# Patient Record
Sex: MALE | Race: WHITE | HISPANIC OR LATINO | Employment: FULL TIME | URBAN - METROPOLITAN AREA
[De-identification: names, ages, dates, MRNs, and addresses within clinical notes are randomized per-mention and may not be internally consistent; named-entity substitution may affect disease eponyms.]

---

## 2017-04-07 ENCOUNTER — LAB VISIT (OUTPATIENT)
Dept: LAB | Facility: HOSPITAL | Age: 52
End: 2017-04-07
Attending: FAMILY MEDICINE
Payer: COMMERCIAL

## 2017-04-07 ENCOUNTER — OFFICE VISIT (OUTPATIENT)
Dept: FAMILY MEDICINE | Facility: CLINIC | Age: 52
End: 2017-04-07
Payer: COMMERCIAL

## 2017-04-07 VITALS
HEART RATE: 59 BPM | DIASTOLIC BLOOD PRESSURE: 74 MMHG | WEIGHT: 207.81 LBS | HEIGHT: 66 IN | SYSTOLIC BLOOD PRESSURE: 130 MMHG | TEMPERATURE: 97 F | OXYGEN SATURATION: 96 % | BODY MASS INDEX: 33.4 KG/M2

## 2017-04-07 DIAGNOSIS — Z72.0 SMOKELESS TOBACCO USE: ICD-10-CM

## 2017-04-07 DIAGNOSIS — N48.89: ICD-10-CM

## 2017-04-07 DIAGNOSIS — E66.09 NON MORBID OBESITY DUE TO EXCESS CALORIES: ICD-10-CM

## 2017-04-07 DIAGNOSIS — Z00.00 WELLNESS EXAMINATION: ICD-10-CM

## 2017-04-07 DIAGNOSIS — N47.1 TIGHT PREPUCE: ICD-10-CM

## 2017-04-07 DIAGNOSIS — Z00.00 WELLNESS EXAMINATION: Primary | ICD-10-CM

## 2017-04-07 LAB
ALBUMIN SERPL BCP-MCNC: 3.9 G/DL
ALP SERPL-CCNC: 61 U/L
ALT SERPL W/O P-5'-P-CCNC: 24 U/L
ANION GAP SERPL CALC-SCNC: 10 MMOL/L
AST SERPL-CCNC: 21 U/L
BASOPHILS # BLD AUTO: 0.03 K/UL
BASOPHILS NFR BLD: 0.4 %
BILIRUB SERPL-MCNC: 2.7 MG/DL
BUN SERPL-MCNC: 17 MG/DL
CALCIUM SERPL-MCNC: 9.2 MG/DL
CHLORIDE SERPL-SCNC: 107 MMOL/L
CHOLEST/HDLC SERPL: 4.8 {RATIO}
CO2 SERPL-SCNC: 25 MMOL/L
CREAT SERPL-MCNC: 1 MG/DL
DIFFERENTIAL METHOD: NORMAL
EOSINOPHIL # BLD AUTO: 0.1 K/UL
EOSINOPHIL NFR BLD: 1.7 %
ERYTHROCYTE [DISTWIDTH] IN BLOOD BY AUTOMATED COUNT: 13.2 %
EST. GFR  (AFRICAN AMERICAN): >60 ML/MIN/1.73 M^2
EST. GFR  (NON AFRICAN AMERICAN): >60 ML/MIN/1.73 M^2
GLUCOSE SERPL-MCNC: 102 MG/DL
HCT VFR BLD AUTO: 46.4 %
HDL/CHOLESTEROL RATIO: 20.8 %
HDLC SERPL-MCNC: 202 MG/DL
HDLC SERPL-MCNC: 42 MG/DL
HGB BLD-MCNC: 16 G/DL
LDLC SERPL CALC-MCNC: 116.6 MG/DL
LYMPHOCYTES # BLD AUTO: 2.3 K/UL
LYMPHOCYTES NFR BLD: 33.1 %
MCH RBC QN AUTO: 31 PG
MCHC RBC AUTO-ENTMCNC: 34.5 %
MCV RBC AUTO: 90 FL
MONOCYTES # BLD AUTO: 0.4 K/UL
MONOCYTES NFR BLD: 6.1 %
NEUTROPHILS # BLD AUTO: 4 K/UL
NEUTROPHILS NFR BLD: 58.4 %
NONHDLC SERPL-MCNC: 160 MG/DL
PLATELET # BLD AUTO: 293 K/UL
PMV BLD AUTO: 11.4 FL
POTASSIUM SERPL-SCNC: 4.8 MMOL/L
PROT SERPL-MCNC: 7.3 G/DL
RBC # BLD AUTO: 5.16 M/UL
SODIUM SERPL-SCNC: 142 MMOL/L
TRIGL SERPL-MCNC: 217 MG/DL
WBC # BLD AUTO: 6.86 K/UL

## 2017-04-07 PROCEDURE — 83036 HEMOGLOBIN GLYCOSYLATED A1C: CPT

## 2017-04-07 PROCEDURE — 80053 COMPREHEN METABOLIC PANEL: CPT

## 2017-04-07 PROCEDURE — 36415 COLL VENOUS BLD VENIPUNCTURE: CPT | Mod: PO

## 2017-04-07 PROCEDURE — 99999 PR PBB SHADOW E&M-NEW PATIENT-LVL III: CPT | Mod: PBBFAC,,, | Performed by: FAMILY MEDICINE

## 2017-04-07 PROCEDURE — 80061 LIPID PANEL: CPT

## 2017-04-07 PROCEDURE — 99386 PREV VISIT NEW AGE 40-64: CPT | Mod: 25,S$GLB,, | Performed by: FAMILY MEDICINE

## 2017-04-07 PROCEDURE — 85025 COMPLETE CBC W/AUTO DIFF WBC: CPT

## 2017-04-07 NOTE — PROGRESS NOTES
Subjective:       Patient ID: Lio Arshad is a 52 y.o. male.    Chief Complaint: Annual Exam    HPI Comments: 52 y old male obese, tobacco user (chews)  here for a wellness exam , not an structure exercise routine , not on any diet . He might have had  Colonoscopy 2 y ago  . He had Tdap  Maybe 6 y  .Occupation : Works in [employer]  . Last Opthalmology visit : 2 weeks ago .He also would like  to see urology , he has frequent episode of  balanitis maybe and prepuce doesnt  retract properly during intercourse     Review of Systems   Constitutional: Negative.    HENT: Negative.    Respiratory: Negative.    Cardiovascular: Negative.    Gastrointestinal: Negative.    Genitourinary: Positive for penile pain.   Musculoskeletal: Negative.        Objective:      Physical Exam   Constitutional: He is oriented to person, place, and time. He appears well-developed and well-nourished. No distress.   HENT:   Head: Normocephalic and atraumatic.   Right Ear: External ear normal.   Left Ear: External ear normal.   Nose: Nose normal.   Mouth/Throat: No oropharyngeal exudate.   Eyes: Conjunctivae and EOM are normal. Pupils are equal, round, and reactive to light. Right eye exhibits no discharge. Left eye exhibits no discharge. No scleral icterus.   Neck: Normal range of motion. Neck supple. No JVD present. No tracheal deviation present. No thyromegaly present.   Cardiovascular: Normal rate, regular rhythm, normal heart sounds and intact distal pulses.  Exam reveals no gallop and no friction rub.    No murmur heard.  Pulmonary/Chest: Effort normal and breath sounds normal. No stridor. No respiratory distress. He has no wheezes. He has no rales. He exhibits no tenderness.   Abdominal: Soft. Bowel sounds are normal. He exhibits no distension. There is no tenderness. There is no rebound and no guarding.   Musculoskeletal: Normal range of motion. He exhibits no edema or tenderness.   Lymphadenopathy:     He has no cervical adenopathy.    Neurological: He is alert and oriented to person, place, and time. He has normal reflexes. He displays normal reflexes. No cranial nerve deficit. He exhibits normal muscle tone. Coordination normal.   Skin: Skin is warm and dry. No rash noted. He is not diaphoretic. No erythema. No pallor.   Psychiatric: He has a normal mood and affect. His behavior is normal. Judgment and thought content normal.       Assessment:     Lio  was seen today for annual exam.    Diagnoses and all orders for this visit:    Wellness examination  -     CBC auto differential; Future  -     Comprehensive metabolic panel; Future  -     Hemoglobin A1c; Future  -     Lipid panel; Future    Irritation of prepuce  -     Ambulatory consult to Urology    Non morbid obesity due to excess calories    Tight prepuce    Smokeless tobacco use    Other orders  -     Zoster Vaccine - Live      Plan:     Discussed and recommended healthy eating habits and lifestyle changes including daily exercise of 30 mins, low salt diet, low fat diet and weight loss/maintenance to a normal body mass index 25 or below. I recommend routine use of seatbelts. Discouraged illicit drug use including tobacco use. I recommend no alcohol use but if you decide to drink, limit to 1 drink per day for females and 2 drinks per day for males. Keep up with your yearly physical visit with age appropriate screenings, vaccinations, and labs.         See urology   Diet and ex  Assistance with smoking cessation was offered, including:  []  Medications  []  Counseling  []  Printed Information on Smoking Cessation  []  Referral to a Smoking Cessation Program    Patient was counseled regarding smoking for 15 minutes.    Will request colonoscopy records

## 2017-04-07 NOTE — MR AVS SNAPSHOT
"    [facility]  [address] [place] [address]  Phone: [phone]  Fax: [phone]                  Lio Arshad   2017 8:20 AM   Office Visit    Descripción:  Male : 1965   Personal Médico:  Jill Elaine MD   Departamento:  [facility]           Razón de la daria     Annual Exam                Lista de tareas           Citas próximas        Personal Médico Departamento Tfno del dpto    2017 8:20 AM Jill Elaine MD [facility] [phone]      Metas (5 Years of Data)     Ninguna      [facility]     [facility] Línea de Enfermeras - Asistencia   Enfermeras registradas de [facility] pueden ayudarle a reservar james daria, proveer educación para la elyse, asesoría clínica, y otros servicios de asesoramiento.   Llame para maurice servicio gratuito a [phone].             Medicamentos           Mensaje sobre Medicamentos     Verificar los cambios y / o adiciones a burns régimen de medicación son los mismos que discutir con burns médico. Si cualquiera de estos cambios o adiciones son incorrectos, por favor notifique a burns proveedor de atención médica.             Verifique que la siguiente lista de medicamentos es james representación exacta de los medicamentos que está tomando actualmente. Si no hay ningunos reportados, la lista puede estar en pa. Si no es correcta, por favor póngase en contacto con burns proveedor de atención médica. Lleve esta lista con usted en ruchi de emergencia.                Información de referencia clínica           Kiara signos vitales moisés     PS Pulso Temperatura [place] SpO2    130/74 (BP Location: Left arm, Patient Position: Sitting, BP Method: Manual) 59 97.4 °F (36.3 °C) (Oral) 5' 6" (1.676 m) 94.3 kg (207 lb 12.5 oz) 96%    BMI (IMC)                   33.54 kg/m2           Blood Pressure          Most Recent Value    BP  130/74      Tiffani hoang del:  2017 "        Penicillins      Vacunas     Administradas en la fecha de la visita:  2017        None      Registrarse para [facility]     La activación de burns cuenta [facility] es tan fácil elito 1-2-3!    1) Ir a [email or web address], seleccione Registrarse Ahora, meter el código de activación y burns fecha de nacimiento, y seleccione Próximo.    [id number]  Expires: 5/10/2017  4:21 PM      2) Crear un nombre de usuario y contraseña para usar cuando se visita [facility] en el futuro y selecciona james pregunta de seguridad en ruchi de que pierda burns contraseña y seleccione Próximo.    3) Introduzca burns dirección de correo electrónico y megha clic en Registrarse!    Información Adicional  Si tiene alguna pregunta, por favor, e-mail [email or web address] o llame al [phone] para hablar con nuestro personal. Recuerde, [facility] no debe ser usada para necesidades urgentes. En ruchi de emergencia médica, llame al 911.        Language Assistance Services     ATTENTION: Language assistance services are available, free of charge. Please call [phone].      ATENCIÓN: Si habla español, tiene a burns disposición servicios gratuitos de asistencia lingüística. Llame al [phone].     [facility] Ý: N?u b?n nói Ti?ng Vi?t, có các d?ch v? h? tr? ngôn ng? mi?n phí dành cho b?n. G?i s? [phone].         [facility] cumple con las leyes federales aplicables de derechos civiles y no discrimina por motivos de amanda, color, origen nacional, edad, discapacidad, o sexo.                 Lio Arshad   2017 8:20 AM   Office Visit    Description:  Male : 1965   Provider:  Jill Elaine MD   Department:  [facility]           Reason for Visit     Annual Exam                To Do List           Future Appointments        Provider Department Dept Phone    2017 8:20 AM Jill Elaine MD [facility] [phone]      Goals     None      [facility]     [facility] Nurse Care Line - 24/7 Assistance  Unless otherwise directed by your provider, please contact [facility], our nurse care line that is available for 24/7 assistance.     Registered nurses in the [facility] provide: appointment scheduling, clinical advisement, health education, and other advisory services.  Call: [phone] (toll free)               Medications           Message regarding Medications     Verify the changes and/or additions to your medication regime listed below are the same as discussed with your clinician today.  If any of these changes or additions are incorrect, please notify your healthcare provider.             Verify that the below list of medications is an accurate representation of the medications you are currently taking.  If none reported, the list may be blank. If incorrect, please contact your healthcare provider. Carry this list with you in case of emergency.                Clinical Reference Information           Your Vitals Were     BP Pulse Temp Height Weight SpO2    130/74 (BP Location: Left arm, Patient Position: Sitting, BP Method: Manual) 59 97.4 °F (36.3 °C) (Oral) 5' 6" (1.676 m) 94.3 kg (207 lb 12.5 oz) 96%    BMI                   33.54 kg/m2           Blood Pressure          Most Recent Value    BP  130/74      Allergies as of 4/7/2017     Penicillins      Immunizations Administered on Date of Encounter - 4/7/2017     None      [facility] Sign-Up     Activating your [facility] account is as easy as 1-2-3!     1) Visit [email or web address], select Sign Up Now, enter this activation code and your date of birth, then select Next.  [email or web address]  Expires: 5/10/2017  4:21 PM      2) Create a username and password to use when you visit [facility] in the future and select a security question in case you lose your password and select Next.    3) Enter your e-mail address and click Sign Up!    Additional Information  If you have questions, please e-mail " [email or web address] or call [phone] to talk to our [facility] staff. Remember, [facility] is NOT to be used for urgent needs. For medical emergencies, dial 911.         Language Assistance Services     ATTENTION: Language assistance services are available, free of charge. Please call [phone].      ATENCIÓN: Si habla español, tiene a burns disposición servicios gratuitos de asistencia lingüística. Llame al [phone].     CHÚ Ý: N?u b?n nói Ti?ng Vi?t, có các d?ch v? h? tr? ngôn ng? mi?n phí dành cho b?n. G?i s? [phone].         [facility] complies with applicable Federal civil rights laws and does not discriminate on the basis of race, color, national origin, age, disability, or sex.

## 2017-04-08 LAB
ESTIMATED AVG GLUCOSE: 111 MG/DL
HBA1C MFR BLD HPLC: 5.5 %

## 2017-04-11 ENCOUNTER — TELEPHONE (OUTPATIENT)
Dept: FAMILY MEDICINE | Facility: CLINIC | Age: 52
End: 2017-04-11

## 2017-04-11 NOTE — TELEPHONE ENCOUNTER
----- Message from Amie Rey sent at 4/10/2017  3:07 PM CDT -----  Contact: Pt   Pt called and stated he was returning the nurse's call. He can be reached at  [phone].    Thanks,  Tf

## 2017-04-11 NOTE — TELEPHONE ENCOUNTER
----- Message from Ammy Olmstead sent at 4/11/2017  3:49 PM CDT -----  Contact: patient  Calling for test results. Please call patient ASAP @ [phone]. Thanks, merlyn

## 2017-04-21 ENCOUNTER — OFFICE VISIT (OUTPATIENT)
Dept: OPHTHALMOLOGY | Facility: CLINIC | Age: 52
End: 2017-04-21
Payer: COMMERCIAL

## 2017-04-21 ENCOUNTER — OFFICE VISIT (OUTPATIENT)
Dept: FAMILY MEDICINE | Facility: CLINIC | Age: 52
End: 2017-04-21
Payer: COMMERCIAL

## 2017-04-21 ENCOUNTER — LAB VISIT (OUTPATIENT)
Dept: LAB | Facility: HOSPITAL | Age: 52
End: 2017-04-21
Attending: FAMILY MEDICINE
Payer: COMMERCIAL

## 2017-04-21 VITALS
SYSTOLIC BLOOD PRESSURE: 108 MMHG | OXYGEN SATURATION: 96 % | HEART RATE: 61 BPM | TEMPERATURE: 98 F | BODY MASS INDEX: 34.74 KG/M2 | HEIGHT: 66 IN | DIASTOLIC BLOOD PRESSURE: 72 MMHG | WEIGHT: 216.19 LBS

## 2017-04-21 DIAGNOSIS — H10.403 CHRONIC CONJUNCTIVITIS OF BOTH EYES, UNSPECIFIED CHRONIC CONJUNCTIVITIS TYPE: ICD-10-CM

## 2017-04-21 DIAGNOSIS — E80.6 HYPERBILIRUBINEMIA: Primary | ICD-10-CM

## 2017-04-21 DIAGNOSIS — E80.6 HYPERBILIRUBINEMIA: ICD-10-CM

## 2017-04-21 DIAGNOSIS — H01.00A BLEPHARITIS OF UPPER AND LOWER EYELIDS OF BOTH EYES, UNSPECIFIED TYPE: Primary | ICD-10-CM

## 2017-04-21 DIAGNOSIS — H01.00B BLEPHARITIS OF UPPER AND LOWER EYELIDS OF BOTH EYES, UNSPECIFIED TYPE: Primary | ICD-10-CM

## 2017-04-21 DIAGNOSIS — H57.89 RED EYE: ICD-10-CM

## 2017-04-21 PROBLEM — E78.5 DYSLIPIDEMIA: Status: ACTIVE | Noted: 2017-04-21

## 2017-04-21 LAB
BILIRUB DIRECT SERPL-MCNC: 0.5 MG/DL
BILIRUB SERPL-MCNC: 1.7 MG/DL

## 2017-04-21 PROCEDURE — 99999 PR PBB SHADOW E&M-EST. PATIENT-LVL III: CPT | Mod: PBBFAC,,, | Performed by: FAMILY MEDICINE

## 2017-04-21 PROCEDURE — 99213 OFFICE O/P EST LOW 20 MIN: CPT | Mod: S$GLB,,, | Performed by: FAMILY MEDICINE

## 2017-04-21 PROCEDURE — 82248 BILIRUBIN DIRECT: CPT

## 2017-04-21 PROCEDURE — 1160F RVW MEDS BY RX/DR IN RCRD: CPT | Mod: S$GLB,,, | Performed by: FAMILY MEDICINE

## 2017-04-21 PROCEDURE — 99999 PR PBB SHADOW E&M-EST. PATIENT-LVL II: CPT | Mod: PBBFAC,,, | Performed by: OPTOMETRIST

## 2017-04-21 PROCEDURE — 92002 INTRM OPH EXAM NEW PATIENT: CPT | Mod: S$GLB,,, | Performed by: OPTOMETRIST

## 2017-04-21 PROCEDURE — 36415 COLL VENOUS BLD VENIPUNCTURE: CPT | Mod: PO

## 2017-04-21 PROCEDURE — 82247 BILIRUBIN TOTAL: CPT

## 2017-04-21 RX ORDER — NEOMYCIN SULFATE, POLYMYXIN B SULFATE AND DEXAMETHASONE 3.5; 10000; 1 MG/ML; [USP'U]/ML; MG/ML
1 SUSPENSION/ DROPS OPHTHALMIC 4 TIMES DAILY
Qty: 5 ML | Refills: 0 | Status: SHIPPED | OUTPATIENT
Start: 2017-04-21 | End: 2017-05-01

## 2017-04-21 NOTE — PROGRESS NOTES
HPI     New Patient  Chief complaint: Irritation, OS  Onset: about 1.5 months ago  Patient had gone to his doctor in [place] seeking treatment for eye   irritation  Patient was told that he had an eye infection and was given drops  Patient states that he used the drops for about 1 month and returned to   the doctor because he was not getting any better.  Patient states that the doctor had given him a different drops and still   no relief.  Patient then went to Urgent Care because it appeared to be getting worse   and was given a different drop  Patient did not bring any of these drops with him today because he went to   see his PCP and was instructed to see an eye doctor and came straight from   there.  Right eye started to become irritated about 3 days ago.  Eyes are matting in the AM  Blurred vision  Patient is not a CL wearer, glasses only  Patient is accompanied by daughter       Last edited by Saw Watson MA on 4/21/2017  1:47 PM.         Assessment /Plan     For exam results, see Encounter Report.    Blepharitis of upper and lower eyelids of both eyes, unspecified type  -     neomycin-polymyxin-dexamethasone (MAXITROL) 3.5mg/mL-10,000 unit/mL-0.1 % DrpS; Place 1 drop into both eyes 4 (four) times daily.  Dispense: 5 mL; Refill: 0    Chronic conjunctivitis of both eyes, unspecified chronic conjunctivitis type  -     neomycin-polymyxin-dexamethasone (MAXITROL) 3.5mg/mL-10,000 unit/mL-0.1 % DrpS; Place 1 drop into both eyes 4 (four) times daily.  Dispense: 5 mL; Refill: 0      Blepharoconjunctivitis OU.  WC(BID) + LS(BID) + above gtts (QID) X 10 days.  He may need oral AB if this does not work well.  I asked him to RTC if no resolution in 10 days or if S/s recur soon after cessation of treatment.  RTC prn otherwise.

## 2017-04-21 NOTE — MR AVS SNAPSHOT
[facility]  [address] [place] [address]  Phone: [phone]  Fax: [phone]                  Lio Reddyyva   2017 1:45 PM   Office Visit    Descripción:  Male : 1965   Personal Médico:  JACINTA Bhakta, OD   Departamento:  [facility]           Razón de la daria     Eye Problem           Diagnósticos de Esta Visita        Comentarios    Blepharitis of upper and lower eyelids of both eyes, unspecified type    -  Primario     Chronic conjunctivitis of both eyes, unspecified chronic conjunctivitis type                Lista de tareas           Citas próximas        Personal Médico Departamento Tfno del dpto    2017 3:40 PM Kodak Putnam IV, MD [facility] [phone]      Metas (5 Years of Data)     Ninguna      Follow-Up and Disposition     Return if symptoms worsen or fail to improve.      Recetas para recoger        Disp Refills Start End    neomycin-polymyxin-dexamethasone (MAXITROL) 3.5mg/mL-10,000 unit/mL-0.1 % DrpS 5 mL 0 2017    Place 1 drop into both eyes 4 (four) times daily. - Both Eyes    Farmacia: [facility]/pharmacy #[id number] - [place] - [address] AT [facility] [address]: #: [phone]         [facility]     [facility] Línea de Enfermeras - Asistencia   Enfermeras registradas de [facility] pueden ayudarle a reservar james daria, proveer educación para la elyse, asesoría clínica, y otros servicios de asesoramiento.   Llame para maurice servicio gratuito a [phone].             Medicamentos           Mensaje sobre Medicamentos     Verificar los cambios y / o adiciones a burns régimen de medicación son los mismos que discutir con burns médico. Si cualquiera de estos cambios o adiciones son incorrectos, por favor notifique a burns proveedor de atención médica.        EMPEZAR a carl estos medicamentos NUEVOS        Refills    neomycin-polymyxin-dexamethasone (MAXITROL) 3.5mg/mL-10,000  unit/mL-0.1 % DrpS 0    Sig: Place 1 drop into both eyes 4 (four) times daily.    Categoría: Normal    Vía: Both Eyes           Verifique que la siguiente lista de medicamentos es james representación exacta de los medicamentos que está tomando actualmente. Si no hay ningunos reportados, la lista puede estar en pa. Si no es correcta, por favor póngase en contacto con burns proveedor de atención médica. Lleve esta lista con usted en ruchi de emergencia.           Medicamentos Actuales     neomycin-polymyxin-dexamethasone (MAXITROL) 3.5mg/mL-10,000 unit/mL-0.1 % DrpS Place 1 drop into both eyes 4 (four) times daily.           Información de referencia clínica           Alergias     A partir del:  4/21/2017        Penicillins      Vacunas     Administradas en la fecha de la visita:  4/21/2017        None      Registrarse para [facility]     La activación de burns cuenta [facility] es tan fácil eliot 1-2-3!    1) Ir a [email or web address], seleccione Registrarse Ahora, meter el código de activación y burns fecha de nacimiento, y seleccione Próximo.    [id number]  Expires: 5/10/2017  4:21 PM      2) Crear un nombre de usuario y contraseña para usar cuando se visita [facility] en el futuro y selecciona james pregunta de seguridad en ruchi de que pierda burns contraseña y seleccione Próximo.    3) Introduzca burns dirección de correo electrónico y megha [facility] en Registrarse!    Información Adicional  Si tiene alguna pregunta, por favor, e-mail [email or web address] o llame al [phone] para hablar con nuestro personal. Recuerde, [facility] no debe ser usada para necesidades urgentes. En ruchi de emergencia médica, llame al 911.        Smoking Cessation     Si desea dejar de fumar:  · Usted puede ser elegible para recibir servicios gratuitos si usted es un residente de [place] y comenzó a fumar cigarrillos antes del 1 de septiembre de 1988. Llame al Smoking Cessation Trust (SCT) a [phone] o [phone].   Llame 1-800-QUIT-NOW si  usted no cumple con los criterios anteriores.   Póngase en contacto con nosotros por correo electrónico: [email or web address]   Visite nuestro sitio web para obtener más información: [email or web address]            Language Assistance Services     ATTENTION: Language assistance services are available, free of charge. Please call [phone].      ATENCIÓN: Si habla español, tiene a burns disposición servicios gratuitos de asistencia lingüística. Llame al [phone].     CHÚ Ý: N?u b?n nói Ti?ng Vi?t, có các d?ch v? h? tr? ngôn ng? mi?n phí dành cho b?n. G?i s? [phone].         [facility] cumple con las leyes federales aplicables de derechos civiles y no discrimina por motivos de amanda, color, origen nacional, edad, discapacidad, o sexo.                 Lio Arshad   2017 1:45 PM   Office Visit    Description:  Male : 1965   Provider:  JACINTA Bhakta, OD   Department:  [facility]           Reason for Visit     Eye Problem           Diagnoses this Visit        Comments    Blepharitis of upper and lower eyelids of both eyes, unspecified type    -  Primary     Chronic conjunctivitis of both eyes, unspecified chronic conjunctivitis type                To Do List           Future Appointments        Provider Department Dept Phone    2017 3:40 PM Kodak Putnam IV, MD [facility] [phone]      Goals     None      Follow-Up and Disposition     Return if symptoms worsen or fail to improve.       These Medications        Disp Refills Start End    neomycin-polymyxin-dexamethasone (MAXITROL) 3.5mg/mL-10,000 unit/mL-0.1 % DrpS 5 mL 0 2017    Place 1 drop into both eyes 4 (four) times daily. - Both Eyes    Pharmacy: [facility]/pharmacy #[id number] - [place] - [address] AT [facility] Ph #: [phone]         [facility]     [facility] Nurse Care Line -  Assistance  Unless otherwise directed by your provider,  please contact [facility], our nurse care line that is available for 24/7 assistance.     Registered nurses in the [facility] provide: appointment scheduling, clinical advisement, health education, and other advisory services.  Call: [phone] (toll free)               Medications           Message regarding Medications     Verify the changes and/or additions to your medication regime listed below are the same as discussed with your clinician today.  If any of these changes or additions are incorrect, please notify your healthcare provider.        START taking these NEW medications        Refills    neomycin-polymyxin-dexamethasone (MAXITROL) 3.5mg/mL-10,000 unit/mL-0.1 % DrpS 0    Sig: Place 1 drop into both eyes 4 (four) times daily.    Class: Normal    Route: Both Eyes           Verify that the below list of medications is an accurate representation of the medications you are currently taking.  If none reported, the list may be blank. If incorrect, please contact your healthcare provider. Carry this list with you in case of emergency.           Current Medications     neomycin-polymyxin-dexamethasone (MAXITROL) 3.5mg/mL-10,000 unit/mL-0.1 % DrpS Place 1 drop into both eyes 4 (four) times daily.           Clinical Reference Information           Allergies as of 4/21/2017     Penicillins      Immunizations Administered on Date of Encounter - 4/21/2017     None      [facility] Sign-Up     Activating your [facility] account is as easy as 1-2-3!     1) Visit [email or web address], select Sign Up Now, enter this activation code and your date of birth, then select Next.  [email or web address]  Expires: 5/10/2017  4:21 PM      2) Create a username and password to use when you visit [facility] in the future and select a security question in case you lose your password and select Next.    3) Enter your e-mail address and click Sign Up!    Additional Information  If you have questions, please e-mail  [email or web address] or call [phone] to talk to our [facility] staff. Remember, [facility] is NOT to be used for urgent needs. For medical emergencies, dial 911.         Smoking Cessation     If you would like to quit smoking:   You may be eligible for free services if you are a [place] resident and started smoking cigarettes before September 1, 1988.  Call the Smoking Cessation Trust ([facility]) toll free at (188) 563-4192 or (044) 136-4998.   Call 1-800-QUIT-NOW if you do not meet the above criteria.   Contact us via email: [email or web address]   View our website for more information: [email or web address]        Language Assistance Services     ATTENTION: Language assistance services are available, free of charge. Please call [phone].      ATENCIÓN: Si habla español, tiene a burns disposición servicios gratuitos de asistencia lingüística. Llame al [phone].     CHÚ Ý: N?u b?n nói Ti?ng Vi?t, có các d?ch v? h? tr? ngôn ng? mi?n phí dành cho b?n. G?i s? [phone].         [facility] complies with applicable Federal civil rights laws and does not discriminate on the basis of race, color, national origin, age, disability, or sex.

## 2017-04-21 NOTE — MR AVS SNAPSHOT
[facility]  [address]  [place] [address]  Phone: [phone]  Fax: [phone]                  Lio Arshad   2017 9:20 AM   Office Visit    Descripción:  Male : 1965   Personal Médico:  Jill Elaine MD   Departamento:  [facility]           Razón de la daria     Follow-up     watery red eye           Diagnósticos de Esta Visita        Comentarios    Hyperbilirubinemia    -  Primario     Red eye         Dyslipidemia                Lista de tareas           Citas próximas        Personal Médico Departamento Tfno del dpto    2017 10:30 AM MTana Bhakta OD [facility] [phone]    2017 11:20 AM [facility]ATORY[facility] [place] [facility]     2017 3:40 PM Kodak Putnam IV, MD [facility] [phone]      Metas (5 Years of Data)     Ninguna      [facility]     [facility] Línea de Enfermeras - Asistencia   Enfermeras registradas de [facility] pueden ayudarle a reservar james daria, proveer educación para la elyse, asesoría clínica, y otros servicios de asesoramiento.   Llame para maurice servicio gratuito a [phone].             Medicamentos           Mensaje sobre Medicamentos     Verificar los cambios y / o adiciones a burns régimen de medicación son los mismos que discutir con burns médico. Si cualquiera de estos cambios o adiciones son incorrectos, por favor notifique a burns proveedor de atención médica.             Verifique que la siguiente lista de medicamentos es james representación exacta de los medicamentos que está tomando actualmente. Si no hay ningunos reportados, la lista puede estar en pa. Si no es correcta, por favor póngase en contacto con burns proveedor de atención médica. Lleve esta lista con usted en ruchi de emergencia.                Información de referencia clínica           Kiara signos vitales moisés     PS Pulso Temperatura [place] Peso SpO2    108/72 (BP  "Location: Right arm, Patient Position: Sitting, BP Method: Manual) 61 98.2 °F (36.8 °C) (Oral) 5' 6" (1.676 m) 98.1 kg (216 lb 2.6 oz) 96%    BMI ([facility])                   34.89 kg/m2           Blood Pressure          Most Recent Value    BP  108/72      Alergias     A partir del:  4/21/2017        Penicillins      Vacunas     Administradas en la fecha de la visita:  4/21/2017        None      Orders Placed During Today's Visit      Órdenes normales de esta visita    Ambulatory referral to Ophthalmology     Exámenes/Procedimientos futuros Se espera el Vence    Bilirubin, direct  4/21/2017 6/20/2018    Bilirubin, total  4/21/2017 6/20/2018      Registrarse para Cristelazenaida     La activación de burns cuenta [facility] es tan fácil eliot 1-2-3!    1) Ir a [email or web address], seleccione Registrarse Ahora, meter el código de activación y burns fecha de nacimiento, y seleccione Próximo.    [id number]  Expires: 5/10/2017  4:21 PM      2) Crear un nombre de usuario y contraseña para usar cuando se visita [facility] en el futuro y selecciona james pregunta de seguridad en ruchi de que pierda burns contraseña y seleccione Próximo.    3) Introduzca burns dirección de correo electrónico y megha [facility] en Registrarse!    Información Adicional  Si tiene alguna pregunta, por favor, e-mail [email or web address] o llame al [phone] para hablar con nuestro personal. Recuerde, [facility] no debe ser usada para necesidades urgentes. En ruchi de emergencia médica, llame al 911.        Smoking Cessation     Si desea dejar de fumar:  · Usted puede ser elegible para recibir servicios gratuitos si usted es un residente de [place] y comenzó a fumar cigarrillos antes del 1 de septiembre de 1988. Llame al Smoking Cessation Trust (SCT) a [phone] o [phone].   Llame 1-800-QUIT-NOW si usted no cumple con los criterios anteriores.   Póngase en contacto con nosotros por correo electrónico: [email or web address]   Visite nuestro sitio web para " obtener más información: [email or web address]            Language Assistance Services     ATTENTION: Language assistance services are available, free of charge. Please call [phone].      ATENCIÓN: Si habkeila reyna, tiene a bursn disposición servicios gratuitos de asistencia lingüística. Llame al [phone].     CHÚ Ý: N?u b?n nói Ti?ng Vi?t, có các d?ch v? h? tr? ngôn ng? mi?n phí dành cho b?n. G?i s? [phone].         [facility] cumple con las leyes federales aplicables de derechos civiles y no discrimina por motivos de amanda, color, origen nacional, edad, discapacidad, o sexo.                 Lio Arshad   2017 9:20 AM   Office Visit    Description:  Male : 1965   Provider:  Jill Elaine MD   Department:  [facility]           Reason for Visit     Follow-up     watery red eye           Diagnoses this Visit        Comments    Hyperbilirubinemia    -  Primary     Red eye         Dyslipidemia                To Do List           Future Appointments        Provider Department Dept Phone    2017 10:30 AM JACINTA Bhakta OD [facility] [phone]    2017 11:20 AM [facility] [facility]     2017 3:40 PM Kodak Putnam IV, MD [facility] [phone]      Goals     None      [facility]     [facility] Nurse Care Line -  Assistance  Unless otherwise directed by your provider, please contact [facility], our nurse care line that is available for  assistance.     Registered nurses in the [facility] provide: appointment scheduling, clinical advisement, health education, and other advisory services.  Call: [phone] (toll free)               Medications           Message regarding Medications     Verify the changes and/or additions to your medication regime listed below are the same as discussed with your clinician today.  If any of these  "changes or additions are incorrect, please notify your healthcare provider.             Verify that the below list of medications is an accurate representation of the medications you are currently taking.  If none reported, the list may be blank. If incorrect, please contact your healthcare provider. Carry this list with you in case of emergency.                Clinical Reference Information           Your Vitals Were     BP Pulse Temp Height Weight SpO2    108/72 (BP Location: Right arm, Patient Position: Sitting, BP Method: Manual) 61 98.2 °F (36.8 °C) (Oral) 5' 6" (1.676 m) 98.1 kg (216 lb 2.6 oz) 96%    BMI                   34.89 kg/m2           Blood Pressure          Most Recent Value    BP  108/72      Allergies as of 4/21/2017     Penicillins      Immunizations Administered on Date of Encounter - 4/21/2017     None      Orders Placed During Today's Visit      Normal Orders This Visit    Ambulatory referral to Ophthalmology     Future Labs/Procedures Expected by Expires    Bilirubin, direct  4/21/2017 6/20/2018    Bilirubin, total  4/21/2017 6/20/2018      [facility] Sign-Up     Activating your [facility] account is as easy as 1-2-3!     1) Visit [email or web address], select Sign Up Now, enter this activation code and your date of birth, then select Next.  [email or web address]  Expires: 5/10/2017  4:21 PM      2) Create a username and password to use when you visit [facility] in the future and select a security question in case you lose your password and select Next.    3) Enter your e-mail address and click Sign Up!    Additional Information  If you have questions, please e-mail [email or web address] or call [phone] to talk to our [facility] staff. Remember, [facility] is NOT to be used for urgent needs. For medical emergencies, dial 911.         Smoking Cessation     If you would like to quit smoking:   You may be eligible for free services if you are a [place] resident and started smoking cigarettes before " September 1, 1988.  Call the Smoking Cessation Trust (SCT) toll free at (769) 908-5029 or (325) 785-9029.   Call 1-800-QUIT-NOW if you do not meet the above criteria.   Contact us via email: [email or web address]   View our website for more information: [email or web address]        Language Assistance Services     ATTENTION: Language assistance services are available, free of charge. Please call [phone].      ATENCIÓN: Si estelala axel, tiene a burns disposición servicios gratuitos de asistencia lingüística. Llame al [phone].     CHÚ Ý: N?u b?n nói Ti?ng Vi?t, có các d?ch v? h? tr? ngôn ng? mi?n phí dành cho b?n. G?i s? [phone].         [facility] complies with applicable Federal civil rights laws and does not discriminate on the basis of race, color, national origin, age, disability, or sex.

## 2017-04-21 NOTE — PROGRESS NOTES
Subjective:       Patient ID: Lio Arshad is a 52 y.o. male.    Chief Complaint: Follow-up (labs ) and watery red eye    HPI Comments: 52 y old male with L red watery eye for 2 w. Seen By Opth , started on topical steroids and ABx with no relief . No blurry vision, no pain  . He also would like to f.u on His bili , not taking OTC herb , no risk factors for  Infectious hep .  No  hx of Gilbert syndrome       Review of Systems   Constitutional: Negative.    HENT: Negative.    Eyes: Positive for redness and itching. Negative for photophobia, discharge and visual disturbance.   Respiratory: Negative.    Cardiovascular: Negative.    Gastrointestinal: Negative.        Objective:      Physical Exam   Constitutional: He is oriented to person, place, and time. He appears well-developed and well-nourished. No distress.   HENT:   Head: Normocephalic and atraumatic.   Right Ear: External ear normal.   Left Ear: External ear normal.   Nose: Nose normal.   Mouth/Throat: No oropharyngeal exudate.   Eyes: EOM are normal. Pupils are equal, round, and reactive to light. Right eye exhibits no discharge. Left eye exhibits no discharge. Left conjunctiva is injected. No scleral icterus.   Left eye Inner aspect : wedge-shaped growth extending onto the cornea    Neck: Normal range of motion. Neck supple. No JVD present. No tracheal deviation present. No thyromegaly present.   Cardiovascular: Normal rate, regular rhythm, normal heart sounds and intact distal pulses.  Exam reveals no gallop and no friction rub.    No murmur heard.  Pulmonary/Chest: Effort normal and breath sounds normal. No stridor. No respiratory distress. He has no wheezes. He has no rales. He exhibits no tenderness.   Abdominal: Soft. Bowel sounds are normal. He exhibits no distension. There is no tenderness. There is no rebound and no guarding.   Musculoskeletal: Normal range of motion. He exhibits no edema or tenderness.   Lymphadenopathy:     He has no cervical  adenopathy.   Neurological: He is alert and oriented to person, place, and time. He has normal reflexes. He displays normal reflexes. No cranial nerve deficit. He exhibits normal muscle tone. Coordination normal.   Skin: Skin is warm and dry. No rash noted. He is not diaphoretic. No erythema. No pallor.   Psychiatric: He has a normal mood and affect. His behavior is normal. Judgment and thought content normal.       Assessment:       Lio  was seen today for follow-up and watery red eye.    Diagnoses and all orders for this visit:    Hyperbilirubinemia  -     Bilirubin, direct; Future  -     Bilirubin, total; Future    Red eye  -     Ambulatory referral to Ophthalmology      Plan:   Get labs   Use artificial  Tears . F.u with opth

## 2017-04-21 NOTE — LETTER
April 21, 2017      Jill Elaine MD  [id number] [address] [address]           [facility]  [address] [place] [address]  Phone: [phone]  Fax: [phone]          Patient: Lio Arshad   MR Number: [id number]   YOB: 1965   Date of Visit: 4/21/2017       Dear Dr. Jill Elaine:    Thank you for referring Lio Arshad to me for evaluation. Attached you will find relevant portions of my assessment and plan of care.    If you have questions, please do not hesitate to call me. I look forward to following Lio Arshad along with you.    Sincerely,    JACINTA Bhakta, OD    Enclosure  CC:  No Recipients    If you would like to receive this communication electronically, please contact [email or web address] or [phone] to request more information on [employer] access.    For providers and/or their staff who would like to refer a patient to [facility], please contact us through our one-stop-shop provider referral line, [facility], at [phone].    If you feel you have received this communication in error or would no longer like to receive these types of communications, please e-mail [email or web address]

## 2017-05-01 ENCOUNTER — TELEPHONE (OUTPATIENT)
Dept: FAMILY MEDICINE | Facility: CLINIC | Age: 52
End: 2017-05-01

## 2017-05-01 NOTE — TELEPHONE ENCOUNTER
----- Message from Valencia Rich sent at 5/1/2017  3:28 PM CDT -----  Contact: self [phone]  States that he is calling for test results. Please call back at [phone]//thank you acc

## 2017-05-02 ENCOUNTER — TELEPHONE (OUTPATIENT)
Dept: FAMILY MEDICINE | Facility: CLINIC | Age: 52
End: 2017-05-02

## 2017-05-02 DIAGNOSIS — R17 ELEVATED BILIRUBIN: Primary | ICD-10-CM

## 2017-05-02 NOTE — TELEPHONE ENCOUNTER
----- Message from Sandra Biggs MA sent at 5/1/2017  4:23 PM CDT -----  Spoke with patient and results were given, pt verbalized understanding and agreed to see GI. Referral please.

## 2017-05-04 ENCOUNTER — LAB VISIT (OUTPATIENT)
Dept: LAB | Facility: HOSPITAL | Age: 52
End: 2017-05-04
Attending: NURSE PRACTITIONER
Payer: COMMERCIAL

## 2017-05-04 ENCOUNTER — INITIAL CONSULT (OUTPATIENT)
Dept: GASTROENTEROLOGY | Facility: CLINIC | Age: 52
End: 2017-05-04
Payer: COMMERCIAL

## 2017-05-04 VITALS
HEIGHT: 66 IN | WEIGHT: 212.75 LBS | BODY MASS INDEX: 34.19 KG/M2 | SYSTOLIC BLOOD PRESSURE: 112 MMHG | HEART RATE: 58 BPM | DIASTOLIC BLOOD PRESSURE: 72 MMHG

## 2017-05-04 DIAGNOSIS — Z12.11 COLON CANCER SCREENING: ICD-10-CM

## 2017-05-04 DIAGNOSIS — R17 ELEVATED BILIRUBIN: Primary | ICD-10-CM

## 2017-05-04 DIAGNOSIS — R17 ELEVATED BILIRUBIN: ICD-10-CM

## 2017-05-04 LAB
A1AT SERPL-MCNC: 80 MG/DL
ALBUMIN SERPL BCP-MCNC: 3.9 G/DL
ALP SERPL-CCNC: 60 U/L
ALT SERPL W/O P-5'-P-CCNC: 30 U/L
ANION GAP SERPL CALC-SCNC: 11 MMOL/L
AST SERPL-CCNC: 18 U/L
BASOPHILS # BLD AUTO: 0.05 K/UL
BASOPHILS NFR BLD: 0.7 %
BILIRUB DIRECT SERPL-MCNC: 0.5 MG/DL
BILIRUB SERPL-MCNC: 1.8 MG/DL
BUN SERPL-MCNC: 18 MG/DL
CALCIUM SERPL-MCNC: 9.3 MG/DL
CERULOPLASMIN SERPL-MCNC: 15 MG/DL
CHLORIDE SERPL-SCNC: 109 MMOL/L
CO2 SERPL-SCNC: 23 MMOL/L
CREAT SERPL-MCNC: 0.9 MG/DL
DIFFERENTIAL METHOD: ABNORMAL
EOSINOPHIL # BLD AUTO: 0.1 K/UL
EOSINOPHIL NFR BLD: 1.8 %
ERYTHROCYTE [DISTWIDTH] IN BLOOD BY AUTOMATED COUNT: 13 %
EST. GFR  (AFRICAN AMERICAN): >60 ML/MIN/1.73 M^2
EST. GFR  (NON AFRICAN AMERICAN): >60 ML/MIN/1.73 M^2
FERRITIN SERPL-MCNC: 294 NG/ML
GGT SERPL-CCNC: 43 U/L
GLUCOSE SERPL-MCNC: 87 MG/DL
HCT VFR BLD AUTO: 47 %
HGB BLD-MCNC: 16.7 G/DL
IGA SERPL-MCNC: 270 MG/DL
IGG SERPL-MCNC: 1208 MG/DL
IGM SERPL-MCNC: 57 MG/DL
INR PPP: 1
LYMPHOCYTES # BLD AUTO: 2.5 K/UL
LYMPHOCYTES NFR BLD: 34.7 %
MCH RBC QN AUTO: 31.7 PG
MCHC RBC AUTO-ENTMCNC: 35.5 %
MCV RBC AUTO: 89 FL
MONOCYTES # BLD AUTO: 0.4 K/UL
MONOCYTES NFR BLD: 6.1 %
NEUTROPHILS # BLD AUTO: 4 K/UL
NEUTROPHILS NFR BLD: 56.3 %
PLATELET # BLD AUTO: 295 K/UL
PMV BLD AUTO: 11 FL
POTASSIUM SERPL-SCNC: 4.3 MMOL/L
PROT SERPL-MCNC: 7.4 G/DL
PROTHROMBIN TIME: 10.4 SEC
RBC # BLD AUTO: 5.26 M/UL
SODIUM SERPL-SCNC: 143 MMOL/L
WBC # BLD AUTO: 7.17 K/UL

## 2017-05-04 PROCEDURE — 80048 BASIC METABOLIC PNL TOTAL CA: CPT

## 2017-05-04 PROCEDURE — 99999 PR PBB SHADOW E&M-EST. PATIENT-LVL III: CPT | Mod: PBBFAC,,, | Performed by: NURSE PRACTITIONER

## 2017-05-04 PROCEDURE — 99204 OFFICE O/P NEW MOD 45 MIN: CPT | Mod: S$GLB,,, | Performed by: NURSE PRACTITIONER

## 2017-05-04 PROCEDURE — 83516 IMMUNOASSAY NONANTIBODY: CPT

## 2017-05-04 PROCEDURE — 80076 HEPATIC FUNCTION PANEL: CPT

## 2017-05-04 PROCEDURE — 82390 ASSAY OF CERULOPLASMIN: CPT

## 2017-05-04 PROCEDURE — 82728 ASSAY OF FERRITIN: CPT

## 2017-05-04 PROCEDURE — 85025 COMPLETE CBC W/AUTO DIFF WBC: CPT

## 2017-05-04 PROCEDURE — 82977 ASSAY OF GGT: CPT

## 2017-05-04 PROCEDURE — 86704 HEP B CORE ANTIBODY TOTAL: CPT

## 2017-05-04 PROCEDURE — 85610 PROTHROMBIN TIME: CPT

## 2017-05-04 PROCEDURE — 86038 ANTINUCLEAR ANTIBODIES: CPT

## 2017-05-04 PROCEDURE — 86235 NUCLEAR ANTIGEN ANTIBODY: CPT

## 2017-05-04 PROCEDURE — 86706 HEP B SURFACE ANTIBODY: CPT

## 2017-05-04 PROCEDURE — 82784 ASSAY IGA/IGD/IGG/IGM EACH: CPT | Mod: 59

## 2017-05-04 PROCEDURE — 86790 VIRUS ANTIBODY NOS: CPT

## 2017-05-04 PROCEDURE — 87340 HEPATITIS B SURFACE AG IA: CPT

## 2017-05-04 PROCEDURE — 82103 ALPHA-1-ANTITRYPSIN TOTAL: CPT

## 2017-05-04 PROCEDURE — 36415 COLL VENOUS BLD VENIPUNCTURE: CPT | Mod: PO

## 2017-05-04 PROCEDURE — 1160F RVW MEDS BY RX/DR IN RCRD: CPT | Mod: S$GLB,,, | Performed by: NURSE PRACTITIONER

## 2017-05-04 PROCEDURE — 86803 HEPATITIS C AB TEST: CPT

## 2017-05-04 PROCEDURE — 86256 FLUORESCENT ANTIBODY TITER: CPT | Mod: 91

## 2017-05-04 RX ORDER — LOTEPREDNOL ETABONATE 5 MG/G
1 GEL OPHTHALMIC 2 TIMES DAILY
Refills: 0 | COMMUNITY
Start: 2017-04-13 | End: 2017-05-04

## 2017-05-04 RX ORDER — TOBRAMYCIN 3 MG/ML
SOLUTION/ DROPS OPHTHALMIC
Refills: 0 | COMMUNITY
Start: 2017-04-16 | End: 2017-05-04

## 2017-05-04 RX ORDER — SODIUM, POTASSIUM,MAG SULFATES 17.5-3.13G
SOLUTION, RECONSTITUTED, ORAL ORAL
Qty: 354 ML | Refills: 0 | Status: ON HOLD | OUTPATIENT
Start: 2017-05-04 | End: 2017-05-12 | Stop reason: HOSPADM

## 2017-05-04 RX ORDER — LOTEPREDNOL ETABONATE AND TOBRAMYCIN 5; 3 MG/ML; MG/ML
SUSPENSION/ DROPS OPHTHALMIC
Refills: 0 | COMMUNITY
Start: 2017-03-03

## 2017-05-04 NOTE — MR AVS SNAPSHOT
[facility]  [address]  [place] [address]  Phone: [phone]  Fax: [phone]                  Lio Arshad   2017 8:40 AM   Initial consult    Descripción:  Male : 1965   Personal Médico:  Heather Aldana NP   Departamento:  [facility]           Razón de la daria     Abnormal Lab           Diagnósticos de Esta Visita        Comentarios    Elevated bilirubin    -  Primario     Colon cancer screening                Lista de tareas           Citas próximas        Personal Médico Departamento Tfno del dpto    2017 10:15 AM LAB, SAME DAY [facility] [phone]    2017 8:30 AM [facility]1 [facility] [phone]    2017 3:40 PM Kodak Putnam IV, MD [facility] [phone]    8/3/2017 8:00 AM Heather Aldana NP [facility] [phone]      Kiara cirugías futuras / Procedimientos     May 12, 2017   Surgery with Felix Arellano MD   [facility]    [address] [facility] [place] [id number] [phone]              Metas (5 Years of Data)     Ninguna      Follow-Up and Disposition     Return in about 3 months (around 2017).      Recetas para recoger        Disp Refills Start End    SUPREP BOWEL PREP KIT 17.5-3.13-1.6 gram SolR 354 mL 0 2017     Use as directed    [place]: [id number] [place] - [address]: #: [phone]         Gwendolynrosita en Llamada     Shadizenaida En [place]lamada Línea de Enfermeras - Asistencia   Enfermeras registradas de [facility] pueden ayudarle a reservar james daria, proveer educación para la elyse, asesoría clínica, y otros servicios de asesoramiento.   Llame para maurice servicio gratuito a [phone].             Medicamentos           Mensaje sobre Medicamentos     Verificar los cambios y / o adiciones a burns régimen de medicación son los  "mismos que discutir con burns médico. Si cualquiera de estos cambios o adiciones son incorrectos, por favor notifique a burns proveedor de atención médica.        EMPEZAR a carl estos medicamentos NUEVOS        Refills    SUPREP BOWEL PREP KIT 17.5-3.13-1.6 gram SolR 0    Sig: Use as directed    Categoría: Normal      DEJAR de carl estos medicamentos     LOTEMAX 0.5 % DrpG Place 1 drop into both eyes 2 (two) times daily.    tobramycin sulfate 0.3% (TOBREX) 0.3 % ophthalmic solution INSTILL 1-2 DROPS INTO AFFECTED EYE(S) EVERY 4 TO 6 HOURS           Verifique que la siguiente lista de medicamentos es james representación exacta de los medicamentos que está tomando actualmente. Si no hay ningunos reportados, la lista puede estar en pa. Si no es correcta, por favor póngase en contacto con burns proveedor de atención médica. Lleve esta lista con usted en ruchi de emergencia.           Medicamentos Actuales     ZYLET 0.3-0.5 % DrpS INSTILL 1 DROP IN BOTH EYES 4 TIMES DAILY FOR 1 WEEK THEN 1 DROP TWICE DAILY FOR 1 WEEK    SUPREP BOWEL PREP KIT 17.5-3.13-1.6 gram SolR Use as directed           Información de referencia clínica           Kiara signos vitales moisés     PS [facility] [employer] [place]       112/72 58 5' 6" (1.676 m) 96.5 kg (212 lb 11.9 oz) 34.34 kg/m2       Blood Pressure          Most Recent Value    BP  112/72      Alergias     A partir del:  5/4/2017        Penicillins      Vacunas     Administradas en la fecha de la visita:  5/4/2017        None      Orders Placed During Today's Visit      Órdenes normales de esta visita    Case request GI: COLONOSCOPY     Exámenes/Procedimientos futuros Se espera el Vence    Alpha-1-antitrypsin  5/4/2017 7/3/2018    TATYANA  5/4/2017 7/3/2018    Anti-smooth muscle antibody  5/4/2017 7/3/2018    Antimitochondrial antibody  5/4/2017 7/3/2018    Basic metabolic panel  5/4/2017 7/3/2018    CBC auto differential  5/4/2017 7/3/2018    Ceruloplasmin  5/4/2017 7/3/2018    Ferritin  5/4/2017 " 7/3/2018    Gamma GT  5/4/2017 7/3/2018    Hepatic function panel  5/4/2017 7/3/2018    Hepatitis A antibody, IgG  5/4/2017 7/3/2018    Hepatitis B core antibody, total  5/4/2017 7/3/2018    Hepatitis B surface antibody  5/4/2017 7/3/2018    Hepatitis B surface antigen  5/4/2017 7/3/2018    Hepatitis C antibody  5/4/2017 7/3/2018    Immunoglobulins (IgG, IgA, IgM) Quantitative  5/4/2017 7/3/2018    Protime-INR  5/4/2017 7/3/2018    Tissue transglutaminase, IgA  5/4/2017 7/3/2018    US Doppler Abdomen Complete  5/4/2017 5/4/2018      Registrarse para [facility]     La activación de burns cuenta [facility] es tan fácil eliot 1-2-3!    1) Ir a [email or web address], seleccione Registrarse Ahora, meter el código de activación y burns fecha de nacimiento, y seleccione Próximo.    [id number]  Expires: 5/10/2017  4:21 PM      2) Crear un nombre de usuario y contraseña para usar cuando se visita [facility] en el futuro y selecciona james pregunta de seguridad en ruchi de que pierda burns contraseña y seleccione Próximo.    3) Introduzca burns dirección de correo electrónico y megha [facility] en Registrarse!    Información Adicional  Si tiene alguna pregunta, por favor, e-mail [email or web address] o llame al [phone] para hablar con nuestro personal. Recuerde, [facility] no debe ser usada para necesidades urgentes. En ruchi de emergencia médica, llame al [phone].        Smoking Cessation     Si desea dejar de fumar:  · Usted puede ser elegible para recibir servicios gratuitos si usted es un residente de [place] y comenzó a fumar cigarrillos antes del 1 de septiembre de 1988. Llame al Smoking Cessation Trust (SCT) a [phone] o [phone].   Llame 1-800-QUIT-NOW si usted no cumple con los criterios anteriores.   Póngase en contacto con nosotros por correo electrónico: [email or web address]   Visite nuestro sitio web para obtener más información: [email or web address]            Language Assistance Services     ATTENTION: Language  assistance services are available, free of charge. Please call [phone].      ATENCIÓN: Si habla español, tiene a burns disposición servicios gratuitos de asistencia lingüística. Llame al [phone].     CHÚ Ý: N?u b?n nói Ti?ng Vi?t, có các d?ch v? h? tr? ngôn ng? mi?n phí dành cho b?n. G?i s? [phone].         [facility] cumple con las leyes federales aplicables de derechos civiles y no discrimina por motivos de amanda, color, origen nacional, edad, discapacidad, o sexo.                 Lio Arshad   2017 8:40 AM   Initial consult    Description:  Male : 1965   Provider:  Heather Aldana NP   Department:  [facility]           Reason for Visit     Abnormal Lab           Diagnoses this Visit        Comments    Elevated bilirubin    -  Primary     Colon cancer screening                To Do List           Future Appointments        Provider Department Dept Phone    2017 10:15 AM LAB, SAME DAY [facility] [phone]    2017 8:30 AM [facility] [phone]    2017 3:40 PM Kodak Putnam IV, MD [facility] [phone]    8/3/2017 8:00 AM Heather Aldana NP [facility] [phone]      Your Future Surgeries/Procedures     May 12, 2017   Surgery with Felix Arellano MD   [facility]    [address]  [facility] [address] [phone]              Goals     None      Follow-Up and Disposition     Return in about 3 months (around 2017).       These Medications        Disp Refills Start End    SUPREP BOWEL PREP KIT 17.5-3.13-1.6 gram SolR 354 mL 0 2017     Use as directed    Pharmacy: [id number] - [place] - [address] AT [facility] Ph #: [phone]         [facility]     [facility] Nurse Care Line -  Assistance  Unless otherwise directed by your  "provider, please contact [facility], our nurse care line that is available for 24/7 assistance.     Registered nurses in the [facility] provide: appointment scheduling, clinical advisement, health education, and other advisory services.  Call: [phone] (toll free)               Medications           Message regarding Medications     Verify the changes and/or additions to your medication regime listed below are the same as discussed with your clinician today.  If any of these changes or additions are incorrect, please notify your healthcare provider.        START taking these NEW medications        Refills    SUPREP BOWEL PREP KIT 17.5-3.13-1.6 gram SolR 0    Sig: Use as directed    Class: Normal      STOP taking these medications     LOTEMAX 0.5 % DrpG Place 1 drop into both eyes 2 (two) times daily.    tobramycin sulfate 0.3% (TOBREX) 0.3 % ophthalmic solution INSTILL 1-2 DROPS INTO AFFECTED EYE(S) EVERY 4 TO 6 HOURS           Verify that the below list of medications is an accurate representation of the medications you are currently taking.  If none reported, the list may be blank. If incorrect, please contact your healthcare provider. Carry this list with you in case of emergency.           Current Medications     ZYLET 0.3-0.5 % DrpS INSTILL 1 DROP IN BOTH EYES 4 TIMES DAILY FOR 1 WEEK THEN 1 DROP TWICE DAILY FOR 1 WEEK    SUPREP BOWEL PREP KIT 17.5-3.13-1.6 gram SolR Use as directed           Clinical Reference Information           Your Vitals Were     BP Pulse Height Weight BMI       112/72 58 5' 6" (1.676 m) 96.5 kg (212 lb 11.9 oz) 34.34 kg/m2       Blood Pressure          Most Recent Value    BP  112/72      Allergies as of 5/4/2017     Penicillins      Immunizations Administered on Date of Encounter - 5/4/2017     None      Orders Placed During Today's Visit      Normal Orders This Visit    Case request GI: COLONOSCOPY     Future Labs/Procedures Expected by Expires    " Alpha-1-antitrypsin  5/4/2017 7/3/2018    TATYANA  5/4/2017 7/3/2018    Anti-smooth muscle antibody  5/4/2017 7/3/2018    Antimitochondrial antibody  5/4/2017 7/3/2018    Basic metabolic panel  5/4/2017 7/3/2018    CBC auto differential  5/4/2017 7/3/2018    Ceruloplasmin  5/4/2017 7/3/2018    Ferritin  5/4/2017 7/3/2018    Gamma GT  5/4/2017 7/3/2018    Hepatic function panel  5/4/2017 7/3/2018    Hepatitis A antibody, IgG  5/4/2017 7/3/2018    Hepatitis B core antibody, total  5/4/2017 7/3/2018    Hepatitis B surface antibody  5/4/2017 7/3/2018    Hepatitis B surface antigen  5/4/2017 7/3/2018    Hepatitis C antibody  5/4/2017 7/3/2018    Immunoglobulins (IgG, IgA, IgM) Quantitative  5/4/2017 7/3/2018    Protime-INR  5/4/2017 7/3/2018    Tissue transglutaminase, IgA  5/4/2017 7/3/2018    US Doppler Abdomen Complete  5/4/2017 5/4/2018      [facility] Sign-Up     Activating your [facility] account is as easy as 1-2-3!     1) Visit "[email or web address], select Sign Up Now, enter this activation code and your date of birth, then select Next.  [email or web address]  Expires: 5/10/2017  4:21 PM      2) Create a username and password to use when you visit [facility] in the future and select a security question in case you lose your password and select Next.    3) Enter your e-mail address and click Sign Up!    Additional Information  If you have questions, please e-mail [email or web address] or call [phone] to talk to our [facility] staff. Remember, [facility] is NOT to be used for urgent needs. For medical emergencies, dial 911.         Smoking Cessation     If you would like to quit smoking:   You may be eligible for free services if you are a [place] resident and started smoking cigarettes before September 1, 1988.  Call the Smoking Cessation Trust (SCT) toll free at (491) 149-3256 or (878) 713-8656.   Call 2-277-QUIT-NOW if you do not meet the above criteria.   Contact us via email: [email or web address]   View our website  for more information: [email or web address]        Language Assistance Services     ATTENTION: Language assistance services are available, free of charge. Please call [phone].      ATENCIÓN: Si habla axel, tiene a burns disposición servicios gratuitos de asistencia lingüística. Llame al [phone].     CHÚ Ý: N?u b?n nói Ti?ng Vi?t, có các d?ch v? h? tr? ngôn ng? mi?n phí dành cho b?n. G?i s? [phone].         [facility] complies with applicable Federal civil rights laws and does not discriminate on the basis of race, color, national origin, age, disability, or sex.

## 2017-05-04 NOTE — LETTER
May 4, 2017      Jill Elaine MD  [address] [address] [address]           [facility]  [address]  [place] [address]  Phone: [phone]  Fax: [phone]          Patient: Lio Arshad   MR Number: [id number]   YOB: 1965   Date of Visit: 5/4/2017       Dear Dr. Jill Elaine:    Thank you for referring Lio Arshad to me for evaluation. Attached you will find relevant portions of my assessment and plan of care.    If you have questions, please do not hesitate to call me. I look forward to following Lio Arshad along with you.    Sincerely,    Heather Aldana, NP    Enclosure  CC:  No Recipients    If you would like to receive this communication electronically, please contact [email or web address] or [phone] to request more information on [employer] access.    For providers and/or their staff who would like to refer a patient to [facility], please contact us through our one-stop-shop provider referral line, [facility], at [phone].    If you feel you have received this communication in error or would no longer like to receive these types of communications, please e-mail [email or web address]

## 2017-05-04 NOTE — PROGRESS NOTES
Clinic Consult:  [facility] Consultation Note    Reason for Consult:  The primary encounter diagnosis was Elevated bilirubin. A diagnosis of Colon cancer screening was also pertinent to this visit.    PCP: Jill Elaine   [address] [facility] [address] [facility] [address]    HPI:  This is a 52 y.o. male here for evaluation of the above. He was referred to me by Dr. Elaine. He speaks little English but has his wife and sister at bedside. His sister is translating for him. He has a history of hemorrhoids and had hemorrhoid surgery about 2 years ago. He has not had any issues since. He presents to clinic for further evaluation of an isolated bilirubin level. He has never been told this in the past. He admits to drinking alcohol on the weekends, sometimes in excess. He denies any known hepatitis or GIlbert's syndrome. He denies any past or current history of IV drug use or blood transfusions. He is also due for his age related colorectal cancer screening. He has never had a previous colonoscopy. He denies any GI complaints at this time. He denies any change in bowel pattern, hematochezia, melena, nausea and vomiting, abdominal pain, reflux, or weight loss. He denies any family history of colon cancer.    Review of Systems   Constitutional: Negative for fever, malaise/fatigue and weight loss.   HENT: Negative for sore throat.    Respiratory: Negative for cough and wheezing.    Cardiovascular: Negative for chest pain and palpitations.   Gastrointestinal: Negative for abdominal pain, blood in stool, constipation, diarrhea, heartburn, melena, nausea and vomiting.   Genitourinary: Negative for dysuria and frequency.   Musculoskeletal: Negative for back pain, joint pain, myalgias and neck pain.   Skin: Negative for itching and rash.   Neurological: Negative for dizziness, speech change, seizures, loss of consciousness and headaches.   Psychiatric/Behavioral: Negative for depression and substance abuse.  "The patient is not nervous/anxious.        Medical History:  has no past medical history on file.    Surgical History:  has a past surgical history that includes Hemorrhoid surgery.    Family History: family history includes Cancer in his maternal uncle; Diabetes in his mother; Stroke in his father and mother..     Social History:  reports that he has never smoked. His smokeless tobacco use includes Chew. He reports that he drinks alcohol. He reports that he does not use illicit drugs.    Allergies: Reviewed    Home Medications:   No current outpatient prescriptions on file prior to visit.     No current facility-administered medications on file prior to visit.        Physical Exam:  Vital Signs:  /72  Pulse (!) 58  Ht 5' 6" (1.676 m)  Wt 96.5 kg (212 lb 11.9 oz)  BMI 34.34 kg/m2  Body mass index is 34.34 kg/(m^2).  Physical Exam   Constitutional: He is oriented to person, place, and time and well-developed, well-nourished, and in no distress. No distress.   HENT:   Head: Normocephalic.   Eyes: Conjunctivae are normal. Pupils are equal, round, and reactive to light.   Cardiovascular: Normal rate, regular rhythm and normal heart sounds.    Pulmonary/Chest: Effort normal and breath sounds normal. No respiratory distress.   Abdominal: Soft. Bowel sounds are normal. He exhibits no distension. There is no tenderness.   Neurological: He is alert and oriented to person, place, and time. No cranial nerve deficit.   Skin: Skin is warm and dry. No rash noted.   Psychiatric: Mood and affect normal.   Vitals reviewed.      Labs: Pertinent labs reviewed. CBC, CMP, Hepatic function panel.    Endoscopy:  None    CRC Screening: None    Assessment:  1. Elevated bilirubin    2. Colon cancer screening           Recommendations:  Elevated bilirubin  - Patient with isolated elevated bilirubin. Specifically mostly elevated unconjugated bilirubin.  - Unclear etiology of symptoms but possibly related to Gilbert's Syndrome as it " is the unconjugated bilirubin that is normally elevated with this.  - Will perform a complete liver workup including hepatitis and autoimmune serology and abdominal ultrasound.   -     TATYANA; Future; Expected date: 5/4/17  -     Antimitochondrial antibody; Future; Expected date: 5/4/17  -     Anti-smooth muscle antibody; Future; Expected date: 5/4/17  -     CBC auto differential; Future; Expected date: 5/4/17  -     Ceruloplasmin; Future; Expected date: 5/4/17  -     Hepatitis A antibody, IgG; Future; Expected date: 5/4/17  -     Hepatic function panel; Future; Expected date: 5/4/17  -     Gamma GT; Future; Expected date: 5/4/17  -     Hepatitis B core antibody, total; Future; Expected date: 5/4/17  -     Hepatitis B surface antibody; Future; Expected date: 5/4/17  -     Hepatitis B surface antigen; Future; Expected date: 5/4/17  -     Hepatitis C antibody; Future; Expected date: 5/4/17  -     Immunoglobulins (IgG, IgA, IgM) Quantitative; Future; Expected date: 5/4/17  -     Ferritin; Future; Expected date: 5/4/17  -     Tissue transglutaminase, IgA; Future; Expected date: 5/4/17  -     Protime-INR; Future; Expected date: 5/4/17  -     US Doppler Abdomen Complete; Future; Expected date: 5/4/17  -     Alpha-1-antitrypsin; Future; Expected date: 5/4/17  -     Basic metabolic panel; Future; Expected date: 5/4/17    Colon cancer screening  - Due for colon cancer screening.  -     Case request GI: COLONOSCOPY  -     SUPREP BOWEL PREP KIT 17.5-3.13-1.6 gram SolR; Use as directed  Dispense: 354 mL; Refill: 0    Return in about 3 months (around 8/4/2017).      Thank you so much for allowing me to participate in the care of TY Dias

## 2017-05-05 LAB
ANA SER QL IF: NORMAL
HBV CORE AB SERPL QL IA: NEGATIVE
HBV SURFACE AB SER-ACNC: NEGATIVE M[IU]/ML
HBV SURFACE AG SERPL QL IA: NEGATIVE
HCV AB SERPL QL IA: NEGATIVE
HEPATITIS A ANTIBODY, IGG: POSITIVE
MITOCHONDRIA AB TITR SER IF: NORMAL {TITER}

## 2017-05-08 LAB
SMOOTH MUSCLE AB TITR SER IF: ABNORMAL {TITER}
TTG IGA SER IA-ACNC: 7 UNITS

## 2017-05-11 DIAGNOSIS — E88.01 ALPHA-1-ANTITRYPSIN DEFICIENCY: Primary | ICD-10-CM

## 2017-05-12 ENCOUNTER — HOSPITAL ENCOUNTER (OUTPATIENT)
Facility: HOSPITAL | Age: 52
Discharge: HOME | End: 2017-05-12
Attending: INTERNAL MEDICINE | Admitting: INTERNAL MEDICINE
Payer: COMMERCIAL

## 2017-05-12 ENCOUNTER — ANESTHESIA (OUTPATIENT)
Dept: ENDOSCOPY | Facility: HOSPITAL | Age: 52
End: 2017-05-12
Payer: COMMERCIAL

## 2017-05-12 ENCOUNTER — SURGERY (OUTPATIENT)
Age: 52
End: 2017-05-12

## 2017-05-12 ENCOUNTER — ANESTHESIA EVENT (OUTPATIENT)
Dept: ENDOSCOPY | Facility: HOSPITAL | Age: 52
End: 2017-05-12
Payer: COMMERCIAL

## 2017-05-12 VITALS
TEMPERATURE: 98 F | SYSTOLIC BLOOD PRESSURE: 120 MMHG | OXYGEN SATURATION: 98 % | HEART RATE: 62 BPM | RESPIRATION RATE: 16 BRPM | DIASTOLIC BLOOD PRESSURE: 78 MMHG

## 2017-05-12 VITALS — RESPIRATION RATE: 37 BRPM

## 2017-05-12 DIAGNOSIS — Z12.11 SCREEN FOR COLON CANCER: ICD-10-CM

## 2017-05-12 PROCEDURE — 45385 COLONOSCOPY W/LESION REMOVAL: CPT | Mod: 33,,, | Performed by: INTERNAL MEDICINE

## 2017-05-12 PROCEDURE — 88305 TISSUE EXAM BY PATHOLOGIST: CPT | Mod: 26,,, | Performed by: PATHOLOGY

## 2017-05-12 PROCEDURE — 45380 COLONOSCOPY AND BIOPSY: CPT | Mod: 59,,, | Performed by: INTERNAL MEDICINE

## 2017-05-12 PROCEDURE — 37000009 HC ANESTHESIA EA ADD 15 MINS: Performed by: INTERNAL MEDICINE

## 2017-05-12 PROCEDURE — 27201012 HC FORCEPS, HOT/COLD, DISP: Performed by: INTERNAL MEDICINE

## 2017-05-12 PROCEDURE — 45385 COLONOSCOPY W/LESION REMOVAL: CPT | Performed by: INTERNAL MEDICINE

## 2017-05-12 PROCEDURE — 25000003 PHARM REV CODE 250: Performed by: INTERNAL MEDICINE

## 2017-05-12 PROCEDURE — 45380 COLONOSCOPY AND BIOPSY: CPT | Performed by: INTERNAL MEDICINE

## 2017-05-12 PROCEDURE — 27201089 HC SNARE, DISP (ANY): Performed by: INTERNAL MEDICINE

## 2017-05-12 PROCEDURE — 37000008 HC ANESTHESIA 1ST 15 MINUTES: Performed by: INTERNAL MEDICINE

## 2017-05-12 PROCEDURE — 88305 TISSUE EXAM BY PATHOLOGIST: CPT | Performed by: PATHOLOGY

## 2017-05-12 PROCEDURE — 63600175 PHARM REV CODE 636 W HCPCS: Performed by: NURSE ANESTHETIST, CERTIFIED REGISTERED

## 2017-05-12 PROCEDURE — 25000003 PHARM REV CODE 250: Performed by: NURSE ANESTHETIST, CERTIFIED REGISTERED

## 2017-05-12 RX ORDER — LIDOCAINE HYDROCHLORIDE 10 MG/ML
INJECTION INFILTRATION; PERINEURAL
Status: DISCONTINUED | OUTPATIENT
Start: 2017-05-12 | End: 2017-05-12

## 2017-05-12 RX ORDER — PROPOFOL 10 MG/ML
VIAL (ML) INTRAVENOUS
Status: DISCONTINUED | OUTPATIENT
Start: 2017-05-12 | End: 2017-05-12

## 2017-05-12 RX ORDER — SODIUM CHLORIDE, SODIUM LACTATE, POTASSIUM CHLORIDE, CALCIUM CHLORIDE 600; 310; 30; 20 MG/100ML; MG/100ML; MG/100ML; MG/100ML
INJECTION, SOLUTION INTRAVENOUS CONTINUOUS
Status: DISCONTINUED | OUTPATIENT
Start: 2017-05-12 | End: 2017-05-12 | Stop reason: HOSPADM

## 2017-05-12 RX ADMIN — PROPOFOL 50 MG: 10 INJECTION, EMULSION INTRAVENOUS at 02:05

## 2017-05-12 RX ADMIN — PROPOFOL 30 MG: 10 INJECTION, EMULSION INTRAVENOUS at 02:05

## 2017-05-12 RX ADMIN — PROPOFOL 20 MG: 10 INJECTION, EMULSION INTRAVENOUS at 02:05

## 2017-05-12 RX ADMIN — PROPOFOL 100 MG: 10 INJECTION, EMULSION INTRAVENOUS at 02:05

## 2017-05-12 RX ADMIN — LIDOCAINE HYDROCHLORIDE 30 MG: 10 INJECTION, SOLUTION INFILTRATION; PERINEURAL at 02:05

## 2017-05-12 RX ADMIN — SODIUM CHLORIDE, SODIUM LACTATE, POTASSIUM CHLORIDE, AND CALCIUM CHLORIDE: 600; 310; 30; 20 INJECTION, SOLUTION INTRAVENOUS at 02:05

## 2017-05-12 NOTE — BRIEF OP NOTE
[facility] -   Brief Operative Note     SUMMARY     Surgery Date: 5/12/2017     Surgeon(s) and Role:     * Felix Arellano MD - Primary    Assisting Surgeon: None    Pre-op Diagnosis:  Colon cancer screening [Z12.11]    Post-op Diagnosis:  Post-Op Diagnosis Codes:     * Colon cancer screening [Z12.11]    Procedure(s) (LRB):  COLONOSCOPY (N/A)    Anesthesia: Monitor Anesthesia Care    Description of the findings of the procedure: Procedure completed. See Procedure note for details.     Findings/Key Components:  Procedure completed. See Procedure note for details.     Prosthetic/Devices: None    Estimated Blood Loss: None         Specimens:   Specimen (12h ago through future)    Start     Ordered    05/12/17 1430  Specimen to Pathology - Surgery  Once     Comments:  1.  Cecum polyp  2.  Transverse colon polyp  3.  Sigmoid colon polyps x 2  4.  Rectum colon polyp    05/12/17 1442          Discharge Note    SUMMARY     Admit Date: 5/12/2017    Discharge Date and Time: 5/12/2017    Hospital Course (synopsis of major diagnoses, care, treatment, and services provided during the course of the hospital stay): Procedure completed. See Procedure note for details.     Final Diagnosis: Post-Op Diagnosis Codes:     * Colon cancer screening [Z12.11]    Disposition: Discharge home when discharge criteria met.    Follow Up/Patient Instructions: With primary care physician as previously scheduled.    Medications:  Reconciled Home Medications: Current Discharge Medication List      CONTINUE these medications which have NOT CHANGED    Details   ZYLET 0.3-0.5 % DrpS INSTILL 1 DROP IN BOTH EYES 4 TIMES DAILY FOR 1 WEEK THEN 1 DROP TWICE DAILY FOR 1 WEEK  Refills: 0         STOP taking these medications       SUPREP BOWEL PREP KIT 17.5-3.13-1.6 gram SolR Comments:   Reason for Stopping:               Discharge Procedure Orders  Diet general     Activity as tolerated       Follow-up Information     Follow up with Jill  MD Che[place].    Specialty:  Family Medicine    Contact information:    [address] [address] [address] [phone]

## 2017-05-12 NOTE — ANESTHESIA POSTPROCEDURE EVALUATION
Anesthesia Post Evaluation    Patient: Lio Arshad    Procedure(s) Performed: Procedure(s) (LRB):  COLONOSCOPY (N/A)    Final Anesthesia Type: MAC  Patient location during evaluation: PACU  Patient participation: Yes- Able to Participate  Level of consciousness: awake and alert  Post-procedure vital signs: reviewed and stable  Pain management: adequate  Airway patency: patent  PONV status at discharge: No PONV  Anesthetic complications: no      Cardiovascular status: blood pressure returned to baseline  Respiratory status: unassisted, spontaneous ventilation and room air  Hydration status: euvolemic  Follow-up not needed.        Visit Vitals    /75    Pulse (!) 48    Temp 36.6 °C (97.8 °F) (Oral)    Resp 18    SpO2 97%       Pain/Eliezer Score: Pain Assessment Performed: Yes (5/12/2017  1:28 PM)  Presence of Pain: denies (5/12/2017  1:28 PM)

## 2017-05-12 NOTE — ANESTHESIA RELEASE NOTE
Anesthesia Release from PACU Note    Patient: Lio Arshad    Procedure(s) Performed: Procedure(s) (LRB):  COLONOSCOPY (N/A)    Anesthesia type: MAC    Post pain: Adequate analgesia    Post assessment: no apparent anesthetic complications, tolerated procedure well and no evidence of recall    Last Vitals:   Visit Vitals    /75    Pulse (!) 48    Temp 36.6 °C (97.8 °F) (Oral)    Resp 18    SpO2 97%       Post vital signs: stable    Level of consciousness: awake, alert  and oriented    Nausea/Vomiting: no nausea/no vomiting    Complications: none    Airway Patency: patent    Respiratory: unassisted, spontaneous ventilation, room air    Cardiovascular: stable and blood pressure at baseline    Hydration: euvolemic

## 2017-05-12 NOTE — DISCHARGE INSTRUCTIONS

## 2017-05-12 NOTE — H&P (VIEW-ONLY)
Clinic Consult:  [facility] Consultation Note    Reason for Consult:  The primary encounter diagnosis was Elevated bilirubin. A diagnosis of Colon cancer screening was also pertinent to this visit.    PCP: Jill Elaine   [address] [address] / [facility] [address]    HPI:  This is a 52 y.o. male here for evaluation of the above. He was referred to me by Dr. Elaine. He speaks little English but has his wife and sister at bedside. His sister is translating for him. He has a history of hemorrhoids and had hemorrhoid surgery about 2 years ago. He has not had any issues since. He presents to clinic for further evaluation of an isolated bilirubin level. He has never been told this in the past. He admits to drinking alcohol on the weekends, sometimes in excess. He denies any known hepatitis or GIlbert's syndrome. He denies any past or current history of IV drug use or blood transfusions. He is also due for his age related colorectal cancer screening. He has never had a previous colonoscopy. He denies any GI complaints at this time. He denies any change in bowel pattern, hematochezia, melena, nausea and vomiting, abdominal pain, reflux, or weight loss. He denies any family history of colon cancer.    Review of Systems   Constitutional: Negative for fever, malaise/fatigue and weight loss.   HENT: Negative for sore throat.    Respiratory: Negative for cough and wheezing.    Cardiovascular: Negative for chest pain and palpitations.   Gastrointestinal: Negative for abdominal pain, blood in stool, constipation, diarrhea, heartburn, melena, nausea and vomiting.   Genitourinary: Negative for dysuria and frequency.   Musculoskeletal: Negative for back pain, joint pain, myalgias and neck pain.   Skin: Negative for itching and rash.   Neurological: Negative for dizziness, speech change, seizures, loss of consciousness and headaches.   Psychiatric/Behavioral: Negative for depression and substance abuse.  "The patient is not nervous/anxious.        Medical History:  has no past medical history on file.    Surgical History:  has a past surgical history that includes Hemorrhoid surgery.    Family History: family history includes Cancer in his maternal uncle; Diabetes in his mother; Stroke in his father and mother..     Social History:  reports that he has never smoked. His smokeless tobacco use includes Chew. He reports that he drinks alcohol. He reports that he does not use illicit drugs.    Allergies: Reviewed    Home Medications:   No current outpatient prescriptions on file prior to visit.     No current facility-administered medications on file prior to visit.        Physical Exam:  Vital Signs:  /72  Pulse (!) 58  Ht 5' 6" (1.676 m)  Wt 96.5 kg (212 lb 11.9 oz)  BMI 34.34 kg/m2  Body mass index is 34.34 kg/(m^2).  Physical Exam   Constitutional: He is oriented to person, place, and time and well-developed, well-nourished, and in no distress. No distress.   HENT:   Head: Normocephalic.   Eyes: Conjunctivae are normal. Pupils are equal, round, and reactive to light.   Cardiovascular: Normal rate, regular rhythm and normal heart sounds.    Pulmonary/Chest: Effort normal and breath sounds normal. No respiratory distress.   Abdominal: Soft. Bowel sounds are normal. He exhibits no distension. There is no tenderness.   Neurological: He is alert and oriented to person, place, and time. No cranial nerve deficit.   Skin: Skin is warm and dry. No rash noted.   Psychiatric: Mood and affect normal.   Vitals reviewed.      Labs: Pertinent labs reviewed. CBC, CMP, Hepatic function panel.    Endoscopy:  None    CRC Screening: None    Assessment:  1. Elevated bilirubin    2. Colon cancer screening           Recommendations:  Elevated bilirubin  - Patient with isolated elevated bilirubin. Specifically mostly elevated unconjugated bilirubin.  - Unclear etiology of symptoms but possibly related to Gilbert's Syndrome as it " is the unconjugated bilirubin that is normally elevated with this.  - Will perform a complete liver workup including hepatitis and autoimmune serology and abdominal ultrasound.   -     TATYANA; Future; Expected date: 5/4/17  -     Antimitochondrial antibody; Future; Expected date: 5/4/17  -     Anti-smooth muscle antibody; Future; Expected date: 5/4/17  -     CBC auto differential; Future; Expected date: 5/4/17  -     Ceruloplasmin; Future; Expected date: 5/4/17  -     Hepatitis A antibody, IgG; Future; Expected date: 5/4/17  -     Hepatic function panel; Future; Expected date: 5/4/17  -     Gamma GT; Future; Expected date: 5/4/17  -     Hepatitis B core antibody, total; Future; Expected date: 5/4/17  -     Hepatitis B surface antibody; Future; Expected date: 5/4/17  -     Hepatitis B surface antigen; Future; Expected date: 5/4/17  -     Hepatitis C antibody; Future; Expected date: 5/4/17  -     Immunoglobulins (IgG, IgA, IgM) Quantitative; Future; Expected date: 5/4/17  -     Ferritin; Future; Expected date: 5/4/17  -     Tissue transglutaminase, IgA; Future; Expected date: 5/4/17  -     Protime-INR; Future; Expected date: 5/4/17  -     US Doppler Abdomen Complete; Future; Expected date: 5/4/17  -     Alpha-1-antitrypsin; Future; Expected date: 5/4/17  -     Basic metabolic panel; Future; Expected date: 5/4/17    Colon cancer screening  - Due for colon cancer screening.  -     Case request GI: COLONOSCOPY  -     SUPREP BOWEL PREP KIT 17.5-3.13-1.6 gram SolR; Use as directed  Dispense: 354 mL; Refill: 0    Return in about 3 months (around 8/4/2017).      Thank you so much for allowing me to participate in the care of TY Dias

## 2017-05-12 NOTE — TRANSFER OF CARE
Anesthesia Transfer of Care Note    Patient: Lio Arshad    Procedure(s) Performed: Procedure(s) (LRB):  COLONOSCOPY (N/A)    Patient location: PACU    Anesthesia Type: MAC    Transport from OR: Transported from OR on room air with adequate spontaneous ventilation    Post pain: adequate analgesia    Post assessment: no apparent anesthetic complications    Post vital signs: stable    Level of consciousness: awake, alert and oriented    Nausea/Vomiting: no nausea/vomiting    Complications: none    Transfer of care protocol was followed      Last vitals:   Visit Vitals    /75    Pulse (!) 48    Temp 36.6 °C (97.8 °F) (Oral)    Resp 18    SpO2 97%

## 2017-05-12 NOTE — INTERVAL H&P NOTE
The patient has been examined and the H&P has been reviewed:    I concur with the findings and no changes have occurred since H&P was written.    Anesthesia/Surgery risks, benefits and alternative options discussed and understood by patient/family.          Active Hospital Problems    Diagnosis  POA    Screen for colon cancer [Z12.11]  Not Applicable      Resolved Hospital Problems    Diagnosis Date Resolved POA   No resolved problems to display.

## 2017-05-12 NOTE — ANESTHESIA PREPROCEDURE EVALUATION
05/12/2017  Lio Arshad is a 52 y.o., male.    Pre-op Assessment    I have reviewed the Patient Summary Reports.     I have reviewed the Nursing Notes.   I have reviewed the Medications.     Review of Systems  Anesthesia Hx:  No problems with previous Anesthesia  Denies Family Hx of Anesthesia complications.   Denies Personal Hx of Anesthesia complications.   Social:  Non-Smoker    Cardiovascular:  Cardiovascular Normal     Pulmonary:  Pulmonary Normal    Renal/:  Renal/ Normal     Hepatic/GI:   Elevated lfts   Neurological:  Neurology Normal    Endocrine:  Endocrine Normal        Physical Exam  General:  Obesity    Airway/Jaw/Neck:  Airway Findings: Mallampati: II      Chest/Lungs:  Chest/Lungs Findings: Clear to auscultation     Heart/Vascular:  Heart Findings: Rate: Normal             Anesthesia Plan  Type of Anesthesia, risks & benefits discussed:  Anesthesia Type:  MAC  Patient's Preference:   Intra-op Monitoring Plan:   Intra-op Monitoring Plan Comments:   Post Op Pain Control Plan:   Post Op Pain Control Plan Comments:   Induction:   IV  Beta Blocker:  Patient is not currently on a Beta-Blocker (No further documentation required).       Informed Consent: Patient understands risks and agrees with Anesthesia plan.  Questions answered. Anesthesia consent signed with patient.  ASA Score: 1     Day of Surgery Review of History & Physical: I have interviewed and examined the patient. I have reviewed the patient's H&P dated:  There are no significant changes.

## 2017-05-12 NOTE — IP AVS SNAPSHOT
[facility]  [address] [place] [address]           Instrucciones de [place] de Pacientes  Nuestra meta es prepararlo para el éxito. Courtney paquete incluye información sobre burns condición, medicamentos e instrucciones para cuidados en el hogar. Kirk le ayudará a cuidarse y prevenir la necesidad de volver al hospital.     Por favor, hable con burns enfermero o enfermera si tiene alguna pregunta..     Hay muchos detalles a recordar cuando se prepara para salir del hospital. Kirk es lo que necesita hacer:    1. Raton burns medicina. Si usted tiene james receta, revise la lista de medicamentos en las siguientes páginas. Es posible que tenga nuevos medicamentos para recoger en la farmacia y otros que tendrá que dejar de carl. Revise las instrucciones sobre cómo y cuándo carl arabella medicamentos. Consulte con el médico o el enfermeras si no está seguro de qué hacer.    2. Ir a arabella citas de seguimiento. La información específica de seguimiento aparece en las siguientes páginas. Usted puede ser contactado por james enfermera o proveedor clínico sobre las próximas citas. Estar seguro de que tiene todos los números de teléfono para comunicarse si es necesario. Por favor, póngase en contacto con la oficina de burns profesional médico si no puede hacer james daria.     3. Esté atento a señales de advertencia. El médico o la enfermera le dará señales de alarma detallados que debe observar y cuándo llamar para la ayuda. Estas instrucciones también pueden incluir información educativa acerca de burns condición. Si usted experimenta cualquiera de las señales de advertencia para burns elyse, llame a burns médico.           ** Verificar la lista de medicamentos es correcta y está actualizada. Llevar esto con usted en ruchi de emergencia. Si arabella medicamentos tapia cambiado, por favor notifique a burns proveedor de atención médica.             Lista de medicamentos      SIGA tomando estos medicamentos        Additional Info                       ZYLET 0.3-0.5 % Drps   Recargas:  0   Medicamento genérico:  tobramycin-lotepred    Instrucciones:  INSTILL 1 DROP IN BOTH EYES 4 TIMES DAILY FOR 1 WEEK THEN 1 DROP TWICE DAILY FOR 1 WEEK     Begin Date    AM    Noon    PM    Bedtime         DEJE de carl estos medicamentos     SUPREP BOWEL PREP KIT 17.5-3.13-1.6 gram Solr   Medicamento genérico:  sodium,potassium,mag sulfates                  Por favor traiga a todos las citas de seguimiento:    1. Macy copia de las instrucciones de louise.  2. Todos los medicamentos que está tomando maurice momento, en kiara envases originales.  3. Identificación y tarjeta de seguro.    Por favor llegue 15 minutos antes de la hora de la daria.    Por favor llame con 24 horas de antelación si tiene que cambiar burns daria y / o tiempo.        Kiara Citas Programadas     May 19, 2017  8:30 AM CDT   Us Abd Pelvic Dopp with [facility] US1   [facility]    [address]  [place] [address] [phone]            May 23, 2017  3:40 PM CDT   Consult with Kodak Putnam IV, MD   [facility] ([facility]    [address] [place] [address] [place] [address]   [phone]            Aug 03, 2017  8:00 AM CDT   GASTROENTEROLOGY ESTABLISHED PATIENT with Heather Aldana NP   [facility] ([facility]    [address]  [place] [id number] [phone]              Información de seguimiento     Realice un seguimiento con:  Jill Elaine MD    Especialidad:  Family Medicine    Información de contacto:    [address] [address] [place] [address]  [phone]          Instrucciones de louise     Órdenes Futuras    Activity as tolerated     Diet general     Preguntas:    Total calories:      Fat restriction, if any:      Protein restriction, if any:      Na restriction, if any:      Fluid restriction:      Additional restrictions:          Instrucciones a suad de louise         Understanding Colon and Rectal  Polyps    The colon (also called the large intestine) is a muscular tube that forms the last part of the digestive tract. It absorbs water and stores food waste. The colon is about 4 to 6 feet long. The rectum is the last 6 inches of the colon. The colon and rectum have a smooth lining composed of millions of cells. Changes in these cells can lead to growths in the colon that can become cancerous and should be removed. Multiple tests are available to screen for colon cancer, but the colonoscopy is the most recommended test. During colonoscopy, these polyps can be removed. How often you need this test depends on many things including your condition, your family history, symptoms, and what the findings were at the previous colonoscopy.   When the colon lining changes  Changes that happen in the cells that line the colon or rectum can lead to growths called polyps. Over a period of years, polyps can turn cancerous. Removing polyps early may prevent cancer from ever forming.  Polyps  Polyps are fleshy clumps of tissue that form on the lining of the colon or rectum. Small polyps are usually benign (not cancerous). However, over time, cells in a polyp can change and become cancerous. Certain types of polyps known as adenomatous polyps are premalignant. The risk for invasive cancer increases with the size of the polyp and certain cell and gene features. This means that they can become cancerous if they're not removed. Hyperplastic polyps are benign. They can grow quite large and not turn cancerous.   Cancer  Almost all colorectal cancers start when polyp cells begin growing abnormally. As a cancerous tumor grows, it may involve more and more of the colon or rectum. In time, cancer can also grow beyond the colon or rectum and spread to nearby organs or to glands called lymph nodes. The cells can also travel to other parts of the body. This is known as metastasis. The earlier a cancerous tumor is removed, the better the  chance of preventing its spread.    Date Last Reviewed: 8/1/2016  © 1005-3317 [employer], [employer]. [address] [place] [address], [place] [address]. All rights reserved. This information is not intended as a substitute for professional medical care. Always follow your healthcare professional's instructions.            Información de Admisión     Fecha y hora Proveedor Departamento CSN    5/12/2017 12:27 PM Felix Arellano MD [facility] -  [id number]      Los proveedores de cuidado     Personal Médico Rol Especialidad Teléfono principal de la oficina    Felix Arellano MD Attending Provider Gastroenterology [phone]    Felix Arellano MD Surgeon  Gastroenterology [phone]      Kiara signos vitales moisés     PS Pulso Temperatura Resp SpO2       96/60 65 97.8 °F (36.6 °C) (Oral) 14 99%       Recent Lab Values        4/7/2017                           8:46 AM           A1C 5.5           Comment for A1C at  8:46 AM on 4/7/2017:  According to ADA guidelines, hemoglobin A1C <7.0% represents  optimal control in non-pregnant diabetic patients.  Different  metrics may apply to specific populations.   Standards of Medical Care in Diabetes - 2016.  For the purpose of screening for the presence of diabetes:  <5.7%     Consistent with the absence of diabetes  5.7-6.4%  Consistent with increasing risk for diabetes   (prediabetes)  >or=6.5%  Consistent with diabetes  Currently no consensus exists for use of hemoglobin A1C  for diagnosis of diabetes for children.        Pending Labs     Order Current Status    Specimen to Pathology - Surgery Collected (05/12/17 3213)      Alergias     A partir del:  5/12/2017           Reacciones    Penicillins Rash, Swelling      [facility]     [facility] - 24/7    Si burns médico no le ha indicado a lo contrário, por favor contactar a [facility], nuestra línea de cuidado de enfermeras está disponible para asistirle 24/7.    Enfermeras registradas de  [facility] pueden ayudarle a reservar james daria, proveer educación para la elyse, asesoría clínica, y otros servicios de asesoramiento.     Llame para maurice servicio gratuito a [phone].        Directiva Anticipada     James directiva anticipada es un documento que, en ruchi de que ya no capaz de hacer decisiones por sí mismo, le dice a burns equipo médico qué tipo de tratamiento quiere o no quiere recibir, o que le gustaría carl esas decisiones para usted . Si actualmente no tiene james directiva anticipada, [facility] le anima a crear shantell. Para más información llame al: [phone] ([phone]), o entrando en [email or web address].        Language Assistance Services     ATTENTION: Language assistance services are available, free of charge. Please call [phone].      ATENCIÓN: Si habla español, tiene a burns disposición servicios gratuitos de asistencia lingüística. Llame al [phone].     CHÚ Ý: N?u b?n nói Ti?ng Vi?t, có các d?ch v? h? tr? ngôn ng? mi?n phí dành cho b?n. G?i s? [phone].        Registrarse para [facility]     La activación de burns cuenta [facility] es tan fácil eliot 1-2-3!    1) Ir a [email or web address], seleccione Registrarse Ahora, meter el código de activación y burns fecha de nacimiento, y seleccione Próximo.    [id number]  Expires: 6/26/2017 12:25 PM      2) Crear un nombre de usuario y contraseña para usar cuando se visita [facility] en el futuro y selecciona james pregunta de seguridad en ruchi de que pierda burns contraseña y seleccione Próximo.    3) Introduzca burns dirección de correo electrónico y megha denisa en Registrarse!    Información Adicional  Si tiene alguna pregunta, por favor, e-mail [email or web address] o llame al [phone] para hablar con nuestro personal. Recuerde, [facility] no debe ser usada para necesidades urgentes. En ruchi de emergencia médica, llame al [phone].         [facility] cumple con las leyes federales aplicables de  derechos civiles y no discrimina por motivos de amanda, color, origen nacional, edad, discapacidad, o sexo.                        [address] [place] [address]           Patient Discharge Instructions   Our goal is to set you up for success. This packet includes information on your condition, medications, and your home care.  It will help you care for yourself to prevent having to return to the hospital.     Please ask your nurse if you have any questions.      There are many details to remember when preparing to leave the hospital. Here is what you will need to do:    1. Take your medicine. If you are prescribed medications, review your Medication List on the following pages. You may have new medications to  at the pharmacy and others that you'll need to stop taking. Review the instructions for how and when to take your medications. Talk with your doctor or nurses if you are unsure of what to do.     2. Go to your follow-up appointments. Specific follow-up information is listed in the following pages. Your may be contacted by a nurse or clinical provider about future appointments. Be sure we have all of the phone numbers to reach you. Please contact your provider's office if you are unable to make an appointment.     3. Watch for warning signs. Your doctor or nurse will give you detailed warning signs to watch for and when to call for assistance. These instructions may also include educational information about your condition. If you experience any of warning signs to your health, call your doctor.           ** Verificar la lista de medicamentos es correcta y está actualizada. Llevar esto con usted en ruchi de emergencia. Si arabella medicamentos tapia cambiado, por favor notifique a burns proveedor de atención médica.             Medication List      CONTINUE taking these medications        Additional Info                      ZYLET 0.3-0.5 % Drps   Refills:  0   Generic drug:   tobramycin-lotepred    Instructions:  INSTILL 1 DROP IN BOTH EYES 4 TIMES DAILY FOR 1 WEEK THEN 1 DROP TWICE DAILY FOR 1 WEEK     Begin Date    AM    Noon    PM    Bedtime         STOP taking these medications     SUPREP BOWEL PREP KIT 17.5-3.13-1.6 gram Solr   Generic drug:  sodium,potassium,mag sulfates                  Por favor traiga a todos las citas de seguimiento:    1. Macy copia de las instrucciones de louise.  2. Todos los medicamentos que está tomando maurice momento, en arabella envases originales.  3. Identificación y tarjeta de seguro.    Por favor llegue 15 minutos antes de la hora de la daria.    Por favor llame con 24 horas de antelación si tiene que cambiar burns daria y / o tiempo.        Your Scheduled Appointments     May 19, 2017  8:30 AM CDT   Us Abd Pelvic Dopp with [facility] US1   [facility] ([facility])    [address]  [place] [address] [phone]            May 23, 2017  3:40 PM CDT   Consult with Kodak Putnam IV, MD   [facility] ([facility]    [address] [place] [address] [place] [address] [phone]            Aug 03, 2017  8:00 AM CDT   GASTROENTEROLOGY ESTABLISHED PATIENT with Heather Aldana NP   [facility] ([facility]    [address] [facility] [place] [address] [phone]              Follow-up Information     Follow up with Jill Elaine MD.    Specialty:  Family Medicine    Contact information:    [address] [address] [place] [address] [phone]          Discharge Instructions     Future Orders    Activity as tolerated     Diet general     Questions:    Total calories:      Fat restriction, if any:      Protein restriction, if any:      Na restriction, if any:      Fluid restriction:      Additional restrictions:          Discharge Instructions         Understanding Colon and Rectal Polyps    The colon (also called the large intestine) is a muscular tube that forms the last part of the digestive  tract. It absorbs water and stores food waste. The colon is about 4 to 6 feet long. The rectum is the last 6 inches of the colon. The colon and rectum have a smooth lining composed of millions of cells. Changes in these cells can lead to growths in the colon that can become cancerous and should be removed. Multiple tests are available to screen for colon cancer, but the colonoscopy is the most recommended test. During colonoscopy, these polyps can be removed. How often you need this test depends on many things including your condition, your family history, symptoms, and what the findings were at the previous colonoscopy.   When the colon lining changes  Changes that happen in the cells that line the colon or rectum can lead to growths called polyps. Over a period of years, polyps can turn cancerous. Removing polyps early may prevent cancer from ever forming.  Polyps  Polyps are fleshy clumps of tissue that form on the lining of the colon or rectum. Small polyps are usually benign (not cancerous). However, over time, cells in a polyp can change and become cancerous. Certain types of polyps known as adenomatous polyps are premalignant. The risk for invasive cancer increases with the size of the polyp and certain cell and gene features. This means that they can become cancerous if they're not removed. Hyperplastic polyps are benign. They can grow quite large and not turn cancerous.   Cancer  Almost all colorectal cancers start when polyp cells begin growing abnormally. As a cancerous tumor grows, it may involve more and more of the colon or rectum. In time, cancer can also grow beyond the colon or rectum and spread to nearby organs or to glands called lymph nodes. The cells can also travel to other parts of the body. This is known as metastasis. The earlier a cancerous tumor is removed, the better the chance of preventing its spread.    Date Last Reviewed: 8/1/2016  © 0810-2355 [employer]. [address] [place] [address]  [place] [address]. All rights reserved. This information is not intended as a substitute for professional medical care. Always follow your healthcare professional's instructions.            Admission Information     Date & Time Provider Department CSN    5/12/2017 12:27 PM Felix Arellano MD [facility] [id number]      Care Providers     Provider Role Specialty Primary office phone    Felix Arellano MD Attending Provider Gastroenterology [phone]    Felix Arellano MD Surgeon  Gastroenterology [phone]      Your Vitals Were     BP Pulse Temp Resp SpO2       96/60 65 97.8 °F (36.6 °C) (Oral) 14 99%       Recent Lab Values        4/7/2017                           8:46 AM           A1C 5.5           Comment for A1C at  8:46 AM on 4/7/2017:  According to ADA guidelines, hemoglobin A1C <7.0% represents  optimal control in non-pregnant diabetic patients.  Different  metrics may apply to specific populations.   Standards of Medical Care in Diabetes - 2016.  For the purpose of screening for the presence of diabetes:  <5.7%     Consistent with the absence of diabetes  5.7-6.4%  Consistent with increasing risk for diabetes   (prediabetes)  >or=6.5%  Consistent with diabetes  Currently no consensus exists for use of hemoglobin A1C  for diagnosis of diabetes for children.        Pending Labs     Order Current Status    Specimen to Pathology - Surgery Collected (05/12/17 2930)      Allergies as of 5/12/2017        Reactions    Penicillins Rash, Swelling      [facility]     [facility] Nurse Care Line - 24/7 Assistance  Unless otherwise directed by your provider, please contact [facility], our nurse care line that is available for 24/7 assistance.     Registered nurses in the [facility] provide clinical advisement, health education, appointment booking, and other advisory services.  Call for this free service at [phone].        Advance Directives      An advance directive is a document which, in the event you are no longer able to make decisions for yourself, tells your healthcare team what kind of treatment you do or do not want to receive, or who you would like to make those decisions for you.  If you do not currently have an advance directive, [facility] encourages you to create one.  For more information call:  [phone]), [phone] ([phone]),  or log on to [email or web address].        Language Assistance Services     ATTENTION: Language assistance services are available, free of charge. Please call [phone].      ATENCIÓN: Si estelala axel, tiene a burns disposición servicios gratuitos de asistencia lingüística. Llame al [phone].     CHÚ Ý: N?u b?n nói Ti?ng Vi?t, có các d?ch v? h? tr? ngôn ng? mi?n phí dành cho b?n. G?i s? [phone].        [facility] Sign-Up     Activating your [facility] account is as easy as 1-2-3!     1) Visit [email or web address], select Sign Up Now, enter this activation code and your date of birth, then select Next.  [email or web address]  Expires: 6/26/2017 12:25 PM      2) Create a username and password to use when you visit [facility] in the future and select a security question in case you lose your password and select Next.    3) Enter your e-mail address and click Sign Up!    Additional Information  If you have questions, please e-mail [email or web address] or call [phone] to talk to our [facility] staff. Remember, [facility] is NOT to be used for urgent needs. For medical emergencies, dial 911.          [facility] complies with applicable Federal civil rights laws and does not discriminate on the basis of race, color, national origin, age, disability, or sex.

## 2017-05-16 ENCOUNTER — TELEPHONE (OUTPATIENT)
Dept: GASTROENTEROLOGY | Facility: CLINIC | Age: 52
End: 2017-05-16

## 2017-05-16 NOTE — TELEPHONE ENCOUNTER
----- Message from Chelsea Duarte sent at 5/16/2017  3:06 PM CDT -----  Contact: svetlana Vaca - [phone]  Returning missed call for results - please call again

## 2017-05-18 ENCOUNTER — TELEPHONE (OUTPATIENT)
Dept: RADIOLOGY | Facility: HOSPITAL | Age: 52
End: 2017-05-18

## 2017-05-19 ENCOUNTER — HOSPITAL ENCOUNTER (OUTPATIENT)
Dept: RADIOLOGY | Facility: HOSPITAL | Age: 52
Discharge: HOME | End: 2017-05-19
Attending: NURSE PRACTITIONER
Payer: COMMERCIAL

## 2017-05-19 DIAGNOSIS — R17 ELEVATED BILIRUBIN: ICD-10-CM

## 2017-05-19 PROCEDURE — 76705 ECHO EXAM OF ABDOMEN: CPT | Mod: TC,PO

## 2017-05-19 PROCEDURE — 76705 ECHO EXAM OF ABDOMEN: CPT | Mod: 26,,, | Performed by: RADIOLOGY

## 2017-05-23 ENCOUNTER — OFFICE VISIT (OUTPATIENT)
Dept: UROLOGY | Facility: CLINIC | Age: 52
End: 2017-05-23
Payer: COMMERCIAL

## 2017-05-23 ENCOUNTER — TELEPHONE (OUTPATIENT)
Dept: GASTROENTEROLOGY | Facility: CLINIC | Age: 52
End: 2017-05-23

## 2017-05-23 VITALS
WEIGHT: 207.31 LBS | DIASTOLIC BLOOD PRESSURE: 92 MMHG | SYSTOLIC BLOOD PRESSURE: 143 MMHG | BODY MASS INDEX: 33.47 KG/M2

## 2017-05-23 DIAGNOSIS — N47.1 PHIMOSIS: Primary | ICD-10-CM

## 2017-05-23 PROCEDURE — 99999 PR PBB SHADOW E&M-EST. PATIENT-LVL II: CPT | Mod: PBBFAC,,, | Performed by: UROLOGY

## 2017-05-23 PROCEDURE — 99244 OFF/OP CNSLTJ NEW/EST MOD 40: CPT | Mod: S$GLB,,, | Performed by: UROLOGY

## 2017-05-23 RX ORDER — CLOTRIMAZOLE AND BETAMETHASONE DIPROPIONATE 10; .64 MG/G; MG/G
CREAM TOPICAL 2 TIMES DAILY
Qty: 45 G | Refills: 1 | Status: SHIPPED | OUTPATIENT
Start: 2017-05-23 | End: 2017-05-23

## 2017-05-23 NOTE — PROGRESS NOTES
Chief Complaint: Phimosis    HPI:   5/23/17: 53 yo Danish speaking man has fissures and cuts on foreskin from phimosis and desires circumcision.  No abd/pelvic pain and no exac/rel factors.  No hematuria.  No urolithiasis.  No urinary bother.  No  history.  Normal sexual function.  No family prostate cancer.  Referred by Dr. Parker.    Allergies:  Penicillins    Medications:  has a current medication list which includes the following prescription(s): zylet.    Review of Systems:  General: No fever, chills, fatigability, or weight loss.  Skin: No rashes, itching, or changes in color or texture of skin.  Chest: Denies WELLS, cyanosis, wheezing, cough, and sputum production.  Abdomen: Appetite fine. No weight loss. Denies diarrhea, abdominal pain, hematemesis, or blood in stool.  Musculoskeletal: No joint stiffness or swelling. Denies back pain.  : As above.  All other review of systems negative.    PMH:   has no past medical history on file.    PSH:   has a past surgical history that includes Hemorrhoid surgery and Colonoscopy (N/A, 5/12/2017).    FamHx: family history includes Cancer in his maternal uncle; Diabetes in his mother; Stroke in his father and mother.    SocHx:  reports that he has never smoked. His smokeless tobacco use includes Chew. He reports that he drinks alcohol. He reports that he does not use drugs.      Physical Exam:  Vitals:    05/23/17 1539   BP: (!) 143/92     General: A&Ox3, no apparent distress, no deformities  Neck: No masses, normal thyroid  Lungs: normal inspiration, no use of accessory muscles  Heart: normal pulse, no arrhythmias  Abdomen: Soft, NT, ND, no masses, no hernias, no hepatosplenomegaly  Lymphatic: Neck and groin nodes negative  Skin: The skin is warm and dry. No jaundice.  Ext: No c/c/e.  : Test desc loraine, no abnormalities of epididymus. Penis phimotic, with normal penile and scrotal skin. Meatus normal.     Labs/Studies:     Impression/Plan:   1. Discussed with his son  as .  Could perform circumcision if he likes he could also use lotrisone cream first and see how it goes.  After a lot of back and forth, he wanted to try the cream first, but on reconsideration wants circumcision.  Risks/benefits reviewed, consents on chart.

## 2017-05-23 NOTE — TELEPHONE ENCOUNTER
----- Message from Carmencita Zambrano sent at 5/23/2017  1:55 PM CDT -----  Contact: Patients son, Lio( same name as father)  Mr singh is requesting his fathers test results, please call him back at [phone]. Thank you

## 2017-05-23 NOTE — LETTER
May 23, 2017      Jill Elaine MD  [address] [address] [address]           [facility]  [address] [place] [address]  Phone: [phone]  Fax: [phone]          Patient: Lio Arshad   MR Number: [id number]   YOB: 1965   Date of Visit: 5/23/2017       Dear Dr. Jill Elaine:    Thank you for referring Lio Arshad to me for evaluation. Attached you will find relevant portions of my assessment and plan of care.    If you have questions, please do not hesitate to call me. I look forward to following Lio Arshad along with you.    Sincerely,    Kodak Putnam IV, MD    Enclosure  CC:  No Recipients    If you would like to receive this communication electronically, please contact [email or web address] or [phone] to request more information on [employer] Link access.    For providers and/or their staff who would like to refer a patient to [facility], please contact us through our one-stop-shop provider referral line, [facility], at [phone].    If you feel you have received this communication in error or would no longer like to receive these types of communications, please e-mail [email or web address]

## 2017-05-25 ENCOUNTER — TELEPHONE (OUTPATIENT)
Dept: GASTROENTEROLOGY | Facility: CLINIC | Age: 52
End: 2017-05-25

## 2017-05-26 ENCOUNTER — HOSPITAL ENCOUNTER (OUTPATIENT)
Dept: RADIOLOGY | Facility: HOSPITAL | Age: 52
Discharge: HOME | End: 2017-05-26
Attending: UROLOGY
Payer: COMMERCIAL

## 2017-05-26 PROCEDURE — 71020 XR CHEST PA AND LATERAL: CPT | Mod: TC,PO

## 2017-05-26 PROCEDURE — 71020 XR CHEST PA AND LATERAL: CPT | Mod: 26,,, | Performed by: RADIOLOGY

## 2017-05-26 PROCEDURE — 93010 ELECTROCARDIOGRAM REPORT: CPT | Mod: S$GLB,,, | Performed by: INTERNAL MEDICINE

## 2017-05-26 PROCEDURE — 93005 ELECTROCARDIOGRAM TRACING: CPT | Mod: S$GLB,,, | Performed by: UROLOGY

## 2017-06-07 ENCOUNTER — TELEPHONE (OUTPATIENT)
Dept: UROLOGY | Facility: CLINIC | Age: 52
End: 2017-06-07

## 2017-06-07 NOTE — TELEPHONE ENCOUNTER
Spoke with patient via  services and informed him of his surgery arrival time for tomorrow at 0545. Patient states understanding.

## 2017-06-07 NOTE — PRE ADMISSION SCREENING
Pre op instructions reviewed with patient per phone:    To confirm, Your surgeon has instructed you:  Surgery is scheduled 06/08/17 at per MD .      Please report to [facility] 1st floor main lobby by per MD.      INSTRUCTIONS IMPORTANT!!!  ¨ Do not eat, drink, or smoke after 12 midnight-including water. OK to brush teeth, no gum, candy or mints!    ¨ Take only these medicines with a small swallow of water-morning of surgery.  N/A        ____  Do not wear makeup, including mascara.  ____  No powder, lotions or creams to surgical area.  ____  Please remove all jewelry, including piercings and leave at home.  ____  No money or valuables needed. Please leave at home.  ____  Please bring identification and insurance information to hospital.  ____  If going home the same day, arrange for a ride home. You will not be able to   drive if Anesthesia was used.  ____  Children, under 12 years old, must remain in the waiting room with an adult.  They are not allowed in patient areas.  ____  Wear loose fitting clothing. Allow for dressings, bandages.  ____  Stop Aspirin, Ibuprofen, Motrin and Aleve at least 5-7 days before surgery, unless otherwise instructed by your doctor, or the nurse.   You MAY use Tylenol/acetaminophen until day of surgery.  ____  If you take diabetic medication, do not take am of surgery unless instructed by   Doctor.  ____ Stop taking any Fish Oil supplement or any Vitamins that contain Vitamin E at least 5 days prior to surgery.          Bathing Instructions-- The night before surgery and the morning prior to coming to the hospital:   -Do not shave the surgical area.   -Shower and wash your hair and body as usual with anti-bacterial  soap and shampoo.   -Rinse your hair and body completely.   -Use one packet of hibiclens to wash the surgical site (using your hand) gently for 5 minutes.  Do not scrub you skin too hard.   -Do not use hibiclens on your head, face, or genitals.   -Do  not wash with anti-bacterial soap after you use the hibiclens.   -Rinse your body thoroughly.   -Dry with clean, soft towel.  Do not use lotion, cream, deodorant, or powders on   the surgical site.    Use antibacterial soap in place of hibiclens if your surgery is on the head, face or genitals.         Surgical Site Infection    Prevention of surgical site infections:     -Keep incisions clean and dry.   -Do not soak/submerge incisions in water until completely healed.   -Do not apply lotions, powders, creams, or deodorants to site.   -Always make sure hands are cleaned with antibacterial soap/ alcohol-based   prior to touching the surgical site.  (This includes doctors, nurses, staff, and yourself.)    Signs and symptoms:   -Redness and pain around the area where you had surgery   -Drainage of cloudy fluid from your surgical wound   -Fever over 100.4  I have read or had read and explained to me, and understand the above information.

## 2017-06-08 ENCOUNTER — SURGERY (OUTPATIENT)
Age: 52
End: 2017-06-08

## 2017-06-08 ENCOUNTER — HOSPITAL ENCOUNTER (OUTPATIENT)
Facility: HOSPITAL | Age: 52
Discharge: HOME | End: 2017-06-08
Attending: UROLOGY | Admitting: UROLOGY
Payer: COMMERCIAL

## 2017-06-08 ENCOUNTER — TELEPHONE (OUTPATIENT)
Dept: GASTROENTEROLOGY | Facility: CLINIC | Age: 52
End: 2017-06-08

## 2017-06-08 ENCOUNTER — ANESTHESIA (OUTPATIENT)
Dept: SURGERY | Facility: HOSPITAL | Age: 52
End: 2017-06-08
Payer: COMMERCIAL

## 2017-06-08 ENCOUNTER — ANESTHESIA EVENT (OUTPATIENT)
Dept: SURGERY | Facility: HOSPITAL | Age: 52
End: 2017-06-08
Payer: COMMERCIAL

## 2017-06-08 VITALS
HEART RATE: 81 BPM | TEMPERATURE: 98 F | OXYGEN SATURATION: 95 % | RESPIRATION RATE: 15 BRPM | WEIGHT: 206.56 LBS | HEIGHT: 66 IN | DIASTOLIC BLOOD PRESSURE: 81 MMHG | BODY MASS INDEX: 33.2 KG/M2 | SYSTOLIC BLOOD PRESSURE: 118 MMHG

## 2017-06-08 DIAGNOSIS — N47.1 PHIMOSIS: ICD-10-CM

## 2017-06-08 PROCEDURE — 88304 TISSUE EXAM BY PATHOLOGIST: CPT | Mod: 26,,, | Performed by: PATHOLOGY

## 2017-06-08 PROCEDURE — 54161 CIRCUM 28 DAYS OR OLDER: CPT | Mod: ,,, | Performed by: UROLOGY

## 2017-06-08 PROCEDURE — 71000033 HC RECOVERY, INTIAL HOUR: Performed by: UROLOGY

## 2017-06-08 PROCEDURE — 71000015 HC POSTOP RECOV 1ST HR: Performed by: UROLOGY

## 2017-06-08 PROCEDURE — 36000707: Performed by: UROLOGY

## 2017-06-08 PROCEDURE — 37000008 HC ANESTHESIA 1ST 15 MINUTES: Performed by: UROLOGY

## 2017-06-08 PROCEDURE — 63600175 PHARM REV CODE 636 W HCPCS: Performed by: NURSE ANESTHETIST, CERTIFIED REGISTERED

## 2017-06-08 PROCEDURE — 37000009 HC ANESTHESIA EA ADD 15 MINS: Performed by: UROLOGY

## 2017-06-08 PROCEDURE — 25000003 PHARM REV CODE 250: Performed by: UROLOGY

## 2017-06-08 PROCEDURE — 63600175 PHARM REV CODE 636 W HCPCS: Performed by: ANESTHESIOLOGY

## 2017-06-08 PROCEDURE — 36000706: Performed by: UROLOGY

## 2017-06-08 PROCEDURE — 25000003 PHARM REV CODE 250: Performed by: NURSE ANESTHETIST, CERTIFIED REGISTERED

## 2017-06-08 PROCEDURE — 88304 TISSUE EXAM BY PATHOLOGIST: CPT | Performed by: PATHOLOGY

## 2017-06-08 PROCEDURE — 63600175 PHARM REV CODE 636 W HCPCS: Performed by: UROLOGY

## 2017-06-08 RX ORDER — CEFAZOLIN SODIUM 2 G/50ML
2 SOLUTION INTRAVENOUS
Status: DISCONTINUED | OUTPATIENT
Start: 2017-06-08 | End: 2017-06-08 | Stop reason: HOSPADM

## 2017-06-08 RX ORDER — LIDOCAINE HCL/PF 100 MG/5ML
SYRINGE (ML) INTRAVENOUS
Status: DISCONTINUED | OUTPATIENT
Start: 2017-06-08 | End: 2017-06-08

## 2017-06-08 RX ORDER — HYDROCODONE BITARTRATE AND ACETAMINOPHEN 10; 325 MG/1; MG/1
1 TABLET ORAL EVERY 4 HOURS PRN
Status: DISCONTINUED | OUTPATIENT
Start: 2017-06-08 | End: 2017-06-08 | Stop reason: HOSPADM

## 2017-06-08 RX ORDER — HYDROCODONE BITARTRATE AND ACETAMINOPHEN 5; 325 MG/1; MG/1
1 TABLET ORAL EVERY 4 HOURS PRN
Status: DISCONTINUED | OUTPATIENT
Start: 2017-06-08 | End: 2017-06-08 | Stop reason: HOSPADM

## 2017-06-08 RX ORDER — ONDANSETRON 2 MG/ML
INJECTION INTRAMUSCULAR; INTRAVENOUS
Status: DISCONTINUED | OUTPATIENT
Start: 2017-06-08 | End: 2017-06-08

## 2017-06-08 RX ORDER — FENTANYL CITRATE 50 UG/ML
INJECTION, SOLUTION INTRAMUSCULAR; INTRAVENOUS
Status: DISCONTINUED | OUTPATIENT
Start: 2017-06-08 | End: 2017-06-08

## 2017-06-08 RX ORDER — ACETAMINOPHEN 10 MG/ML
1000 INJECTION, SOLUTION INTRAVENOUS ONCE
Status: DISCONTINUED | OUTPATIENT
Start: 2017-06-08 | End: 2017-06-08 | Stop reason: HOSPADM

## 2017-06-08 RX ORDER — MIDAZOLAM HYDROCHLORIDE 1 MG/ML
INJECTION, SOLUTION INTRAMUSCULAR; INTRAVENOUS
Status: DISCONTINUED | OUTPATIENT
Start: 2017-06-08 | End: 2017-06-08

## 2017-06-08 RX ORDER — MORPHINE SULFATE 10 MG/ML
2 INJECTION INTRAMUSCULAR; INTRAVENOUS; SUBCUTANEOUS EVERY 5 MIN PRN
Status: DISCONTINUED | OUTPATIENT
Start: 2017-06-08 | End: 2017-06-08 | Stop reason: HOSPADM

## 2017-06-08 RX ORDER — SUCCINYLCHOLINE CHLORIDE 20 MG/ML
INJECTION INTRAMUSCULAR; INTRAVENOUS
Status: DISCONTINUED | OUTPATIENT
Start: 2017-06-08 | End: 2017-06-08

## 2017-06-08 RX ORDER — SODIUM CHLORIDE 9 MG/ML
3 INJECTION, SOLUTION INTRAMUSCULAR; INTRAVENOUS; SUBCUTANEOUS
Status: DISCONTINUED | OUTPATIENT
Start: 2017-06-08 | End: 2017-06-08 | Stop reason: HOSPADM

## 2017-06-08 RX ORDER — PROPOFOL 10 MG/ML
VIAL (ML) INTRAVENOUS
Status: DISCONTINUED | OUTPATIENT
Start: 2017-06-08 | End: 2017-06-08

## 2017-06-08 RX ORDER — SODIUM CHLORIDE 9 MG/ML
3 INJECTION, SOLUTION INTRAMUSCULAR; INTRAVENOUS; SUBCUTANEOUS EVERY 8 HOURS
Status: DISCONTINUED | OUTPATIENT
Start: 2017-06-08 | End: 2017-06-08 | Stop reason: HOSPADM

## 2017-06-08 RX ORDER — BUPIVACAINE HYDROCHLORIDE 2.5 MG/ML
INJECTION, SOLUTION EPIDURAL; INFILTRATION; INTRACAUDAL
Status: DISCONTINUED | OUTPATIENT
Start: 2017-06-08 | End: 2017-06-08 | Stop reason: HOSPADM

## 2017-06-08 RX ORDER — DEXAMETHASONE SODIUM PHOSPHATE 4 MG/ML
INJECTION, SOLUTION INTRA-ARTICULAR; INTRALESIONAL; INTRAMUSCULAR; INTRAVENOUS; SOFT TISSUE
Status: DISCONTINUED | OUTPATIENT
Start: 2017-06-08 | End: 2017-06-08

## 2017-06-08 RX ORDER — ROCURONIUM BROMIDE 10 MG/ML
INJECTION, SOLUTION INTRAVENOUS
Status: DISCONTINUED | OUTPATIENT
Start: 2017-06-08 | End: 2017-06-08

## 2017-06-08 RX ORDER — DOCUSATE SODIUM 100 MG/1
100 CAPSULE, LIQUID FILLED ORAL 2 TIMES DAILY
Qty: 60 CAPSULE | Refills: 0 | Status: SHIPPED | OUTPATIENT
Start: 2017-06-08

## 2017-06-08 RX ORDER — MEPERIDINE HYDROCHLORIDE 50 MG/ML
12.5 INJECTION INTRAMUSCULAR; INTRAVENOUS; SUBCUTANEOUS ONCE AS NEEDED
Status: DISCONTINUED | OUTPATIENT
Start: 2017-06-08 | End: 2017-06-08 | Stop reason: HOSPADM

## 2017-06-08 RX ORDER — OXYCODONE AND ACETAMINOPHEN 5; 325 MG/1; MG/1
1-2 TABLET ORAL EVERY 4 HOURS PRN
Qty: 30 TABLET | Refills: 0 | Status: SHIPPED | OUTPATIENT
Start: 2017-06-08

## 2017-06-08 RX ORDER — SODIUM CHLORIDE, SODIUM LACTATE, POTASSIUM CHLORIDE, CALCIUM CHLORIDE 600; 310; 30; 20 MG/100ML; MG/100ML; MG/100ML; MG/100ML
INJECTION, SOLUTION INTRAVENOUS CONTINUOUS PRN
Status: DISCONTINUED | OUTPATIENT
Start: 2017-06-08 | End: 2017-06-08

## 2017-06-08 RX ORDER — HYDROMORPHONE HYDROCHLORIDE 2 MG/ML
0.2 INJECTION, SOLUTION INTRAMUSCULAR; INTRAVENOUS; SUBCUTANEOUS EVERY 5 MIN PRN
Status: DISCONTINUED | OUTPATIENT
Start: 2017-06-08 | End: 2017-06-08 | Stop reason: HOSPADM

## 2017-06-08 RX ADMIN — ROCURONIUM BROMIDE 5 MG: 10 INJECTION, SOLUTION INTRAVENOUS at 06:06

## 2017-06-08 RX ADMIN — HYDROMORPHONE HYDROCHLORIDE 0.2 MG: 2 INJECTION, SOLUTION INTRAMUSCULAR; INTRAVENOUS; SUBCUTANEOUS at 08:06

## 2017-06-08 RX ADMIN — SUCCINYLCHOLINE CHLORIDE 100 MG: 20 INJECTION, SOLUTION INTRAMUSCULAR; INTRAVENOUS at 06:06

## 2017-06-08 RX ADMIN — FENTANYL CITRATE 25 MCG: 50 INJECTION, SOLUTION INTRAMUSCULAR; INTRAVENOUS at 07:06

## 2017-06-08 RX ADMIN — LIDOCAINE HYDROCHLORIDE 40 MG: 20 INJECTION, SOLUTION INTRAVENOUS at 06:06

## 2017-06-08 RX ADMIN — SODIUM CHLORIDE, SODIUM LACTATE, POTASSIUM CHLORIDE, AND CALCIUM CHLORIDE: 600; 310; 30; 20 INJECTION, SOLUTION INTRAVENOUS at 06:06

## 2017-06-08 RX ADMIN — ONDANSETRON 4 MG: 2 INJECTION, SOLUTION INTRAMUSCULAR; INTRAVENOUS at 07:06

## 2017-06-08 RX ADMIN — FENTANYL CITRATE 50 MCG: 50 INJECTION, SOLUTION INTRAMUSCULAR; INTRAVENOUS at 06:06

## 2017-06-08 RX ADMIN — DEXAMETHASONE SODIUM PHOSPHATE 4 MG: 4 INJECTION, SOLUTION INTRA-ARTICULAR; INTRALESIONAL; INTRAMUSCULAR; INTRAVENOUS; SOFT TISSUE at 07:06

## 2017-06-08 RX ADMIN — BUPIVACAINE HYDROCHLORIDE 30 ML: 2.5 INJECTION, SOLUTION EPIDURAL; INFILTRATION; INTRACAUDAL; PERINEURAL at 07:06

## 2017-06-08 RX ADMIN — PROPOFOL 170 MG: 10 INJECTION, EMULSION INTRAVENOUS at 06:06

## 2017-06-08 RX ADMIN — CEFAZOLIN SODIUM 2 G: 2 SOLUTION INTRAVENOUS at 06:06

## 2017-06-08 RX ADMIN — MIDAZOLAM HYDROCHLORIDE 2 MG: 1 INJECTION, SOLUTION INTRAMUSCULAR; INTRAVENOUS at 06:06

## 2017-06-08 NOTE — TRANSFER OF CARE
"Anesthesia Transfer of Care Note    Patient: Lio Arshad    Procedure(s) Performed: Procedure(s) (LRB):  CIRCUMCISION (N/A)    Patient location: PACU    Anesthesia Type: general    Transport from OR: Transported from OR on room air with adequate spontaneous ventilation    Post pain: adequate analgesia    Post assessment: no apparent anesthetic complications    Post vital signs: stable    Level of consciousness: responds to stimulation    Nausea/Vomiting: no nausea/vomiting    Complications: none    Transfer of care protocol was followed      Last vitals:   Visit Vitals  /60   Pulse 84   Temp 36.5 °C (97.7 °F) (Temporal)   Resp 18   Ht 5' 6" (1.676 m)   Wt 93.7 kg (206 lb 9.1 oz)   SpO2 96%   BMI 33.34 kg/m²     "

## 2017-06-08 NOTE — ANESTHESIA RELEASE NOTE
"Anesthesia Release from PACU Note    Patient: Lio Arshad    Procedure(s) Performed: Procedure(s) (LRB):  CIRCUMCISION (N/A)    Anesthesia type: general    Post pain: Adequate analgesia    Post assessment: no apparent anesthetic complications, tolerated procedure well and no evidence of recall    Last Vitals:   Visit Vitals  /60   Pulse 84   Temp 36.5 °C (97.7 °F) (Temporal)   Resp 18   Ht 5' 6" (1.676 m)   Wt 93.7 kg (206 lb 9.1 oz)   SpO2 96%   BMI 33.34 kg/m²       Post vital signs: stable    Level of consciousness: responds to stimulation    Nausea/Vomiting: no nausea/no vomiting    Complications: none    Airway Patency: patent    Respiratory: unassisted    Cardiovascular: stable and blood pressure at baseline    Hydration: euvolemic     "

## 2017-06-08 NOTE — OP NOTE
Date of Procedure: 06/08/2017    PREOPERATIVE DIAGNOSIS:  Phimosis.    POSTOPERATIVE DIAGNOSIS:  Phimosis.    PROCEDURES:  Circumcision.    SURGEON:  Danilo Putnam IV, M.D.    Assistants: None    Specimen: Foreskin    Prosthetics, Devices, Grafts: None    ANESTHESIA:  General endotracheal.    FINDINGS:  The patient had phimosis and requested circumcision.  This was done without difficulty.    PROCEDURE IN DETAIL:  After proper consents were obtained, the patient was brought to the Operating Room where he was prepped and draped in normal sterile fashion and provided with general endotracheal anesthesia in the supine position.  The distal circumcision incision was marked circumferentially after taking down the frenulum approximately 4 mm. This incision was opened sharply and subcutaneous tissues were taken down to Macdonald's fascia with the Bovie.  This was repeated on the proximal circumcision incision.  The intervening ring of skin was taken and divided from the penis using the Bovie.  There was excellent skin coverage of the foreskin defect.  The wound was then closed circumferentially using 4-0 chromic in interrupted fashion.  Approximately 5 mL of 0.25% Marcaine was instilled for local anesthesia around the base of the penis in a ring block.  The wound was then cleaned and dressed with Vaseline gauze, sterile gauze and Coban loosely wrapped.  He tolerated this well and was awakened and transferred to Recovery in stable condition.    BLOOD LOSS:  10 ml.    BLOOD GIVEN:  None.    URINE OUTPUT:  None.    DRAINS:  None.    DISCHARGE DISPOSITION:  Home.    FOLLOWUP PLAN:  The patient will return to clinic in two weeks, and will call if any difficulties arise.  He was provided with pain medicines and stool softener and instructions for care at home.

## 2017-06-08 NOTE — ANESTHESIA POSTPROCEDURE EVALUATION
"Anesthesia Post Evaluation    Patient: Lio Arshad    Procedure(s) Performed: Procedure(s) (LRB):  CIRCUMCISION (N/A)    Final Anesthesia Type: general  Patient location during evaluation: PACU  Patient participation: Yes- Able to Participate  Level of consciousness: awake  Post-procedure vital signs: reviewed and stable  Pain management: adequate  Airway patency: patent  PONV status at discharge: No PONV  Anesthetic complications: no      Cardiovascular status: blood pressure returned to baseline  Respiratory status: unassisted, spontaneous ventilation and room air  Hydration status: euvolemic  Follow-up not needed.        Visit Vitals  /81 (BP Location: Right arm, Patient Position: Lying, BP Method: Automatic)   Pulse 81   Temp 36.6 °C (97.9 °F) (Temporal)   Resp 15   Ht 5' 6" (1.676 m)   Wt 93.7 kg (206 lb 9.1 oz)   SpO2 95%   BMI 33.34 kg/m²       Pain/Eliezer Score: Pain Assessment Performed: Yes (6/8/2017  8:30 AM)  Presence of Pain: complains of pain/discomfort (6/8/2017  8:30 AM)  Pain Rating Prior to Med Admin: 7 (6/8/2017  8:25 AM)  Eliezer Score: 10 (6/8/2017  8:30 AM)      "

## 2017-06-08 NOTE — DISCHARGE SUMMARY
Date of Discharge: 06/08/2017     Principle Diagnosis: Circumcision    Secondary Diagnosis:  has no past medical history on file.    History of Present Illness: Pt was worked up in clinic and today's procedure was scheduled.  Please see H&P for full details.    Hospital Course: Pt presented on the day of surgery and after proper consents were obtained he was brought to the OR where his procedure was performed without difficulty.    Discharge Disposition: Home    Followup Plan:  1. Pt is provided with medications for pain and others as indicated.  2. RTC in 2 weeks

## 2017-06-08 NOTE — INTERVAL H&P NOTE
The patient has been examined and the H&P has been reviewed:    I concur with the findings and no changes have occurred since H&P was written.    Anesthesia/Surgery risks, benefits and alternative options discussed and understood by patient/family.          Active Hospital Problems    Diagnosis  POA    Phimosis [N47.1]  Yes      Resolved Hospital Problems    Diagnosis Date Resolved POA   No resolved problems to display.

## 2017-06-08 NOTE — H&P (VIEW-ONLY)
Chief Complaint: Phimosis    HPI:   5/23/17: 53 yo [place] speaking man has fissures and cuts on foreskin from phimosis and desires circumcision.  No abd/pelvic pain and no exac/rel factors.  No hematuria.  No urolithiasis.  No urinary bother.  No  history.  Normal sexual function.  No family prostate cancer.  Referred by Dr. Parker.    Allergies:  Penicillins    Medications:  has a current medication list which includes the following prescription(s): zylet.    Review of Systems:  General: No fever, chills, fatigability, or weight loss.  Skin: No rashes, itching, or changes in color or texture of skin.  Chest: Denies WELLS, cyanosis, wheezing, cough, and sputum production.  Abdomen: Appetite fine. No weight loss. Denies diarrhea, abdominal pain, hematemesis, or blood in stool.  Musculoskeletal: No joint stiffness or swelling. Denies back pain.  : As above.  All other review of systems negative.    PMH:   has no past medical history on file.    PSH:   has a past surgical history that includes Hemorrhoid surgery and Colonoscopy (N/A, 5/12/2017).    FamHx: family history includes Cancer in his maternal uncle; Diabetes in his mother; Stroke in his father and mother.    SocHx:  reports that he has never smoked. His smokeless tobacco use includes Chew. He reports that he drinks alcohol. He reports that he does not use drugs.      Physical Exam:  Vitals:    05/23/17 1539   BP: (!) 143/92     General: A&Ox3, no apparent distress, no deformities  Neck: No masses, normal thyroid  Lungs: normal inspiration, no use of accessory muscles  Heart: normal pulse, no arrhythmias  Abdomen: Soft, NT, ND, no masses, no hernias, no hepatosplenomegaly  Lymphatic: Neck and groin nodes negative  Skin: The skin is warm and dry. No jaundice.  Ext: No c/c/e.  : Test desc loraine, no abnormalities of epididymus. Penis phimotic, with normal penile and scrotal skin. Meatus normal.     Labs/Studies:     Impression/Plan:   1. Discussed with his son  as .  Could perform circumcision if he likes he could also use lotrisone cream first and see how it goes.  After a lot of back and forth, he wanted to try the cream first, but on reconsideration wants circumcision.  Risks/benefits reviewed, consents on chart.

## 2017-06-08 NOTE — ANESTHESIA PREPROCEDURE EVALUATION
06/08/2017  Lio Arshad is a 52 y.o., male.    Anesthesia Evaluation    I have reviewed the Patient Summary Reports.    I have reviewed the Nursing Notes.   I have reviewed the Medications.     Review of Systems  Anesthesia Hx:  No problems with previous Anesthesia  Denies Family Hx of Anesthesia complications.   Denies Personal Hx of Anesthesia complications.   Social:  Non-Smoker, Social Alcohol Use    Hematology/Oncology:  Hematology Normal   Oncology Normal     EENT/Dental:EENT/Dental Normal   Cardiovascular:   Exercise tolerance: good hyperlipidemia ECG has been reviewed. Normal sinus rhythm  Normal ECG  No previous ECGs available  Confirmed by NAOMI OLVERA MD ([id number]) on 5/29/2017 8:08:15 PM   Pulmonary:  Pulmonary Normal    Renal/:   Phimosis   Hepatic/GI:   Hx hemorrhoid surgery   Musculoskeletal:  Musculoskeletal Normal    Neurological:  Neurology Normal    Endocrine:  Endocrine Normal    Dermatological:  Skin Normal    Psych:  Psychiatric Normal           Physical Exam  General:  Well nourished, Obesity    Airway/Jaw/Neck:  Airway Findings: Mouth Opening: Normal Tongue: Normal  General Airway Assessment: Adult, Average  Mallampati: II  TM Distance: Normal, at least 6 cm      Dental:  Dental Findings: In tact   Chest/Lungs:  Chest/Lungs Findings: Clear to auscultation, Normal Respiratory Rate     Heart/Vascular:  Heart Findings: Rate: Normal  Rhythm: Regular Rhythm  Sounds: Normal        Mental Status:  Mental Status Findings:  Cooperative, Alert and Oriented         Anesthesia Plan  Type of Anesthesia, risks & benefits discussed:  Anesthesia Type:  general  Patient's Preference:   Intra-op Monitoring Plan: standard ASA monitors  Intra-op Monitoring Plan Comments:   Post Op Pain Control Plan: multimodal analgesia  Post Op Pain Control Plan Comments:   Induction:   IV  Beta Blocker:  Patient is not  currently on a Beta-Blocker (No further documentation required).       Informed Consent: Patient understands risks and agrees with Anesthesia plan.  Questions answered. Anesthesia consent signed with patient.  ASA Score: 2     Day of Surgery Review of History & Physical: I have interviewed and examined the patient. I have reviewed the patient's H&P dated: 6/8/17. There are no significant changes.  H&P update referred to the surgeon.         Ready For Surgery From Anesthesia Perspective.

## 2017-06-08 NOTE — DISCHARGE INSTRUCTIONS
Instrucciones de louise para la circuncisión  Burns bebé ha sido sometido a un procedimiento llamado circuncisión. En maurice procedimiento se corta el prepucio del pene del bebé (la capa de piel que recubre la carl del pene). La circuncisión suele hacerse antes de que el bebé sea dado de louise del hospital. Burns proveedor de atención médica le explicó el procedimiento y le dijo lo que debe esperar. Siga las pautas que se dan en esta hoja para cuidar al bebé después de burns circuncisión.    Lo que debe esperar  · Probablemente verá james costra de shoaib o james capa amarillenta alrededor de la carl del pene. No limpie demasiado jefry costra porque podría sangrar.  · El pene se inflamará un poco, o podría sangrar un poco alrededor de la incisión.  · La carl del pene estará un poco enrojecida o ligeramente amoratada.  · Burns bebé noe vez llore al principio cuando orine, o podría estar quisquilloso anshu los primeros días.  · Keshav al bebé medicamentos contra el dolor según le haya indicado el proveedor de atención médica de burns hijo. Pregúntele a burns proveedor de atención médica si puede usar medicamentos sin receta contra el dolor.  · La circuncisión deberá sanar en unas 2 semanas.  Cómo limpiar el pene de burns bebé  · Consulte al proveedor de atención médica de burns hijo para confirmar si usted debe cubrir la carl del pene de burns bebé con un vaselina cada vez que le cambie el pañal anshu las primeras 2 semanas.  · Use james toallita suave mojada en agua tibia para limpiar delicadamente el pene de burns bebé. Puede usar jabón suave si el pene del bebé tiene heces, sharon la mayoría de las veces no se necesita jabón.  · No seque el pene del bebé con james toalla. Déjelo que se seque al aire.  · Para ayudar a prevenir la infección, cambie los pañales de burns bebé inmediatamente después de que orine o evacúe.  Cuidado del vendaje de burns bebé  · Si burns bebé tiene james venda de gasa, cámbiela o quítela de acuerdo a las instrucciones del proveedor de  atención médica del micky. Usted quitará la venda un día después de la cirugía o la cambiará cada vez que cambie el pañal de burns bebé.  · Si burns bebé tiene un dispositivo en forma de un anillo de plástico, permita que la tapa se caiga por sí melissa. [place] tarda de 3-10 días. Llame al proveedor de atención médica de burns bebé si la tapa se  en los 2 primeros días o si no se  james vez transcurridos 10 días.  Visitas de control  Programe james visita de control según le indique nuestro personal.     Cuándo debe llamar a burns médico  Llame al proveedor de atención médica de burns bebé inmediatamente si burns micky presenta cualquiera de los siguientes síntomas:  · Enrojecimiento excesivo del pene  · Inflamación excesiva del pene  · Fiebre de más de 100.4 °F o 38 ºC (temperatura rectal)  · Supuración espesa o de color verdoso, o que dure más de james semana  · Sangrado que no pueda detenerse aplicando james presión suave   Date Last Reviewed: 2014  © 6539-9722 [employer]. [address] [place] [address]. Todos los derechos reservados. Esta información no pretende sustituir la atención médica profesional. Sólo burns médico puede diagnosticar y tratar un problema de elyse.    General Information:    1. Do not drink alcoholic beverages including beer for 24 hours or as long as you are on pain medication..  2. Do not drive a motor vehicle, operate machinery or power tools, or signs legal papers for 24 hours or as long as you are on pain medication.   3. You may experience light-headedness, dizziness, and sleepiness following surgery. Please do not stay alone. A responsible adult should be with you for this 24 hour period.  4. Go home and rest.  5. Progress slowly to a normal diet unless instructed.  Otherwise, begin with liquids such as soft drinks, then soup and crackers working up to solid foods. Drink plenty of nonalcoholic fluids.  6. Certain anesthetics and pain medications produce nausea and vomiting in certain  individuals. If nausea becomes a problem at home, call you doctor.  7. A nurse will be calling you sometime after surgery. Do not be alarmed. This is our way of finding out how you are doing.  8. Several times every hour while you are awake, take 2-3 deep breaths and cough. If you had stomach surgery hold a pillow or rolled towel firmly against your stomach before you cough. This will help with any pain the cough might cause.  9. Several times every hour while you are awake, pump and flex your feet 5-6 times and do foot circles. This will help prevent blood clots.  10. Call your doctor for severe pain, bleeding, fever, or signs or symptoms of infection (pain, swelling, redness, foul odor, drainage).  11. You can contact your doctor anytime by [phone] for the [facility] (at [facility]) or [phone] for the [facility] on [facility].   [email or web address] is another way to contact your doctor if you are an active participant online with [facility].      Docusate capsules  ¿Qué es maurice medicamento?  El DOCUSATO es un ablandador fecal. Ayuda a prevenir el estreñimiento y los esfuerzos para defecar o las molestias asociadas con las heces duras o secas.  ¿Cómo darian utilizar maurice medicamento?  [place] maurice medicamento por vía oral con un vaso de agua. Siga las instrucciones de la etiqueta del medicamento. [place] arabella dosis a intervalos regulares. No tome burns medicamento con james frecuencia mayor a la indicada.  Hable con burns pediatra para informarse acerca del uso de maurice medicamento en niños. Aunque maurice medicamento ha sido recetado a niños tan menores eliot de 2 años de edad para condiciones selectivas, las precauciones se aplican.  ¿Qué efectos secundarios puedo tener al utilizar maurice medicamento?  Efectos secundarios que debe informar a burns médico o a burns profesional de la elyse tan pronto eliot sea posible:  · reacciones alérgicas eliot erupción cutánea, picazón o urticarias, hinchazón de la bishop, labios o  lengua  Efectos secundarios que, por lo general, no requieren atención médica (debe informarlos a burns médico o a burns profesional de la elyse si persisten o si son molestos):  · diarrea  · calambres estomacales  · irritación de la garganta  ¿Qué puede interactuar con maurice medicamento?  · aceite mineral  ¿Qué sucede si me olvido de james dosis?  Si olvida james dosis, tómela lo antes posible. Si es tawanda la hora de la próxima dosis, tome sólo jefry dosis. No tome dosis adicionales o dobles.  ¿Dónde darian guardar mi medicina?  [place] fuera del alcance de los niños.  Guárdela a temperatura ambiente, entre 15 y 30 grados C (59 y 86 grados F). Deseche todo el medicamento que no haya utilizado, después de la fecha de vencimiento.  ¿Qué le darian informar a mi profesional de la elyse antes de carl maurice medicamento?  Necesita saber si usted presenta alguno de los siguientes problemas o situaciones:  · náuseas o vómito  · estreñimiento severo  · dolor de estómago  · cambio repentino en el hábito intestinal que dura más de 2 semanas  · james reacción alérgica o inusual al docusato, otros medicamentos, alimentos, colorantes o conservantes  · si está embarazada o buscando quedar embarazada  · si está amamantando a un bebé  ¿A qué darian estar atento al usar maurice medicamento?  No lo utilice anshu más de james semana sin consultar a burns médico o a burns profesional de la elyse. Consulte a burns médico o a burns profesional de la elyse si el estreñimiento reaparece.  Nayana agua en abundancia mientras esté tomando maurice medicamento para ayudar a combatir el estreñimiento.  Deje de usar maurice medicamento y comuníquese con burns médico o burns profesional de la elyse si experimenta sangrado rectal o no evacua los intestinos después de usar. Éstos pueden ser síntomas de james enfermedad más grave.  Date Last Reviewed:   © 2299-2376 [employer] [place]. [address] [place] [address], [place] [address]. Todos los derechos reservados. Esta información no pretende  sustituir la atención médica profesional. Sólo burns médico puede diagnosticar y tratar un problema de elyse.      Acetaminophen; Oxycodone tablets  ¿Qué es maurice medicamento?  ACETAMINOFENO; OXICODONA es un analgésico. Se utiliza para tratar los yonathan moderados a severos.  ¿Cómo darian utilizar maurice medicamento?  [place] maurice medicamento por vía oral con un vaso lleno de agua. Siga las instrucciones de la etiqueta del medicamento. Usted puede carl maurice medicamento con o sin alimentos. Si le produce malestar estomacal, tómelo con alimentos. [place] burns medicamento a intervalos regulares. No tome burns medicamento con james frecuencia mayor que la indicada.  Hable con burns pediatra para informarse acerca del uso de maurice medicamento en niños. Puede requerir atención especial.  Los pacientes de más de 65 años de edad pueden presentar reacciones más roxanne a maurice medicamento y necesitar dosis menores.  ¿Qué efectos secundarios puedo tener al utilizar maurice medicamento?  Efectos secundarios que debe informar a burns médico o a burns profesional de la elyse tan pronto eliot sea posible:  · reacciones alergicas, tales eliot erupción cutánea, picazón o urticarias, hinchazón de la bishop, labios o lengua  · dificultades respiratorias, sibilancias  · confusión  · sensación de desmayos o aturdimiento  · dolor de estómago severo  · cansancio o debilidad inusual  · color amarillento de los ojos o la piel  Efectos secundarios que, por lo general, no requieren atención médica (debe informarlos a burns médico o a burns profesional de la elyse si persisten o si son molestos):  · mareos  · somnolencia  · náuseas  · vómitos  ¿Qué puede interactuar con maurice medicamento?  · alcohol  · antihistamínicos  · barbitúricos tales eliot el amobarbital, butalbital, butabarbital, metohexital, pentobarbital, fenobarbital, tiopental y secobarbital  · benztropina  · medicamentos para problemas de vejiga, tales eliot solifenacina, trospium, oxibutinina, tolterodina, hiosciamina y  metscopolamina  · medicamentos para problemas respiratorios, tales eliot ipratropio y tiotropio  · medicamentos para ciertos problemas estomacales o intestinales, tales eliot propantelina, homatropina metilbromuro, glucopirrolato, atropina, belladona y diciclomina  · anestésicos generales, tales eliot etomidato, ketamina, óxido nitroso, propofol, desflurano, enflurano, halotano, isoflurano y sevoflurano  · medicamentos para la depresión, ansiedad o trastornos psicóticos  · medicamentos para dormir  · relajantes musculares  · naltrexona  · medicamentos narcóticos (opiáceos) para el dolor  · fenotiazinas, tales eliot perfenacina, tioridazina, clorpromacina, mesoridazina, flufenazina, proclorperazina, promazina y trifluoperazina  · escopolamina  · tramadol  · trihexifenidilo  ¿Qué sucede si me olvido de james dosis?  Si olvida james dosis, tómela lo antes posible. Si es tawanda la hora de la próxima dosis, tome sólo jefry dosis. No tome dosis adicionales o dobles.  ¿Dónde darian guardar mi medicina?  [place] fuera del alcance de los niños. Maurice medicamento puede ser abusado. Mantenga burns medicamento en un lugar seguro para protegerlo contra robos. No comparta maurice medicamento con nadie. Es peligroso  o ceder maurice medicamento y está prohibido por la jose.  Guárdelo a temperatura ambiente, entre 20 y 25 grados C (68 y 77 grados F). Mantenga el envase satish cerrado. Protéjalo krista.   Maurice medicamento puede causar muerte y sobredosis accidental si es tomado por otros adultos, niños o mascotas. Tire los medicamentos que no haya utilizado al inodoro para reducir la posibilidad de daño. No use el medicamento después de la fecha de vencimiento.  ¿Qué le darian informar a mi profesional de la elyse antes de carl maurice medicamento?  Necesita saber si usted presenta alguno de los siguientes problemas o situaciones:  · tumor cerebral  · enfermedad de Crohn, enfermedad intestinal inflamatoria o colitis ulcerativa  · abuso de drogas o  drogadicción  · lesión de la carl  · problemas cardiacos o circulatorios  · si consume alcohol con frecuencia  · enfermedad renal o problemas al orinar  · enfermedad hepática  · enfermedad pulmonar, asma o dificultades al respirar  · james reacción alérgica o inusual al acetaminofeno, a la oxicodona, a otros analgésicos opiáceos, a otros medicamentos, alimentos, colorantes o conservantes  · si está embarazada o buscando quedar embarazada  · si está amamantando a un bebé  ¿A qué darian estar atento al usar maurice medicamento?  Si el dolor no desaparece, si empeora o si experimenta un dolor nuevo o de tipo diferente, consulte a burns médico o a burns profesional de la elyse. Usted puede desarrollar tolerancia al medicamento. La tolerancia significa que necesitará james dosis más louise para aliviar el dolor. Tolerancia es normal y esperada cuando esté tomando maurice medicamento por un rahel período de tiempo.  No suspenda el uso de burns medicamento repentinamente debido a que puede desarrollar james reacción severa. Burns cuerpo se acostumbra a maurice medicamento. Angostura NO significa que sea adicto. La adicción es un comportamiento que hace referencia a la obtención y utilización de un medicamento con fines que no son médicos. Si tiene dolor, existe james razón médica para que usted tome un analgésico. Burns médico le indicará la cantidad de medicamento que necesitará carl. Si burns médico desea que pare el tratamiento, la dosis será reducida gradualmente para evitar efectos secundarios.  Puede experimentar somnolencia o mareos. No conduzca ni utilice maquinaria ni megha nada que le exija permanecer en estado de alerta hasta que sepa cómo le afecta maurice medicamento. No se siente ni se ponga de pie con rapidez, especialmente si es un paciente de edad avanzada. Angostura reduce el riesgo de mareos o desmayos. El alcohol puede interferir con el efecto de maurice medicamento. Evite consumir bebidas alcohólicas.  Hay distintos tipos de medicamentos narcóticos  (opiáceos) para el dolor. Si usted akira más que un tipo a la misma vez, podrá tener más efectos secundarios. [place] a burns proveedor de atención medica james lista de todos los medicamentos que usted usa. Burns médico le informará la cantidad de medicamento que necesita carl. No tome más medicamento que lo indicado. Comuníquese con emergencia para ayuda si tiene problemas para respirar.  Maurice medicamento causará estreñimiento. Trate de evacuar los intestinos al menos cada 2 ó 3 días. Si no evacua los intestinos anshu 3 días, comuníquese con burns médico o con burns profesional de la elyse.  No tome Tylenol (acetaminofeno) u otros medicamentos que contienen acetaminofeno con maurice medicamento. Tomando mucho acetaminofeno puede ser muy peligroso. Muchos medicamentos de venta cecily contienen acetaminofeno. Jodi siempre las etiquetas cuidadosamente para evitar el carl más acetaminofeno.  Date Last Reviewed:   © 0615-1859 [employer]. [address] [place] [address], [place] [address]. Todos los derechos reservados. Esta información no pretende sustituir la atención médica profesional. Sólo burns médico puede diagnosticar y tratar un problema de elyse.

## 2017-06-09 ENCOUNTER — TELEPHONE (OUTPATIENT)
Dept: GASTROENTEROLOGY | Facility: CLINIC | Age: 52
End: 2017-06-09

## 2017-06-09 NOTE — TELEPHONE ENCOUNTER
----- Message from Susana Talbert sent at 6/9/2017  9:28 AM CDT -----  Contact: PT   Pt is calling to get his test results,, please call pt back at [phone]

## 2017-06-13 ENCOUNTER — TELEPHONE (OUTPATIENT)
Dept: GASTROENTEROLOGY | Facility: CLINIC | Age: 52
End: 2017-06-13

## 2017-06-13 DIAGNOSIS — K76.0 FATTY LIVER DISEASE, NONALCOHOLIC: ICD-10-CM

## 2017-06-13 DIAGNOSIS — R17 ELEVATED BILIRUBIN: Primary | ICD-10-CM

## 2017-06-13 NOTE — TELEPHONE ENCOUNTER
----- Message from Dora Mobley sent at 6/13/2017  9:45 AM CDT -----  Pt states he need test results. Please call him back at [phone].

## 2017-06-14 ENCOUNTER — TELEPHONE (OUTPATIENT)
Dept: GASTROENTEROLOGY | Facility: CLINIC | Age: 52
End: 2017-06-14

## 2017-06-14 NOTE — TELEPHONE ENCOUNTER
----- Message from Yu Wells LPN sent at 6/13/2017  2:30 PM CDT -----      ----- Message -----  From: Dora Mobley  Sent: 6/13/2017   9:49 AM  To: [facility] Staff    Pt states he need test results. Please call him back at [phone].

## 2017-06-17 ENCOUNTER — NURSE TRIAGE (OUTPATIENT)
Dept: ADMINISTRATIVE | Facility: CLINIC | Age: 52
End: 2017-06-17

## 2017-06-17 NOTE — TELEPHONE ENCOUNTER
"  Reason for Disposition   [1] Normal circumcision AND [2] age > 3 months (all triage questions negative)    Additional Information   Negative: Cries with passing urine     --Age Consideration--See IAQ--   Negative: [1] Over 3 days since circumcision AND [2] swelling is increasing (without redness)     --Age Consideration--See IAQ--   Commented on: [1] [place] (< 1 month old) AND [2] starts to look or act abnormal in any way (e.g., decrease in activity or feeding)     --Age Consideration--See IAQ--   Commented on: [1] Age < 12 weeks AND [2] fever 100.4 F (38.0 C) or higher rectally     --Age Consideration--See IAQ--   Commented on: [1] Plastibell has moved AND [2] now on shaft of penis     --Age Consideration--See IAQ--   Commented on: [1] Normal circumcision without plastic ring AND [2] age < 3 months (all triage questions negative)     --Age Consideration--See IAQ--   Commented on: [1] Normal circumcision with plastic ring AND [2] age < 3 months (all triage questions negative)     --Age Consideration--See IAQ--   Commented on: ALSO, pain present AND age < 3 months     --Age Consideration--See IAQ--    Answer Assessment - Initial Assessment Questions  1. APPEARANCE of CIRCUMCISION: "What does it look like?"       Normal   2. ONSET: "How long has the circumcision looked abnormal?"       Today   3. CIRCUMCISION: "How many days ago was the circumcision done?" "Is there a plastic ring that was left on the penis?"       17  4. BLEEDING: "Is there any bleeding?" if so, ask "How much?" "Is it difficult to stop?"      Yes, small amount  5. URINATION:  "Is your baby peeing?" "How often?"      Yes   6. FEVER: "Does your  have a fever?" If so, ask: "What is it, how was it measured, and how long has it been present?"       No   7. CHILD'S APPEARANCE: "How sick is your child acting?" " What is he doing right now?" If asleep, ask: "How was he acting before he went to sleep?"  - Author's note: IAQ's are " intended for training purposes and not meant to be required on every call.      ---Please Note the above Protocol was assessed/reviewed and utiliized for male adult--Approval for peds-care advice for adult utilization was obtained/given per Dr. Vela    Protocols used: ST CIRCUMCISION [id number]

## 2017-12-13 ENCOUNTER — TELEPHONE (OUTPATIENT)
Dept: RADIOLOGY | Facility: HOSPITAL | Age: 52
End: 2017-12-13

## 2022-10-31 ENCOUNTER — PATIENT MESSAGE (OUTPATIENT)
Dept: RESEARCH | Facility: HOSPITAL | Age: 57
End: 2022-10-31

## (undated) DEVICE — SEE MEDLINE ITEM 157027

## (undated) DEVICE — ELECTRODE REM PLYHSV RETURN 9

## (undated) DEVICE — GLOVE SURGICAL LATEX SZ 8

## (undated) DEVICE — DRESSING GAUZE CISION 1X8IN

## (undated) DEVICE — MANIFOLD 4 PORT

## (undated) DEVICE — APPLICATOR CHLORAPREP ORN 26ML

## (undated) DEVICE — SYR 10CC LUER LOCK

## (undated) DEVICE — CAUTERY TIP 2 3/4

## (undated) DEVICE — NDL SAFETY 25G X 1.5 ECLIPSE

## (undated) DEVICE — GAUZE SPONGE 4X4 12PLY

## (undated) DEVICE — SEE MEDLINE ITEM 157148

## (undated) DEVICE — SEE MEDLINE ITEM 152528

## (undated) DEVICE — SEE MEDLINE ITEM 152622

## (undated) DEVICE — GOWN SMARTGOWN LVL4 X-LONG XL

## (undated) DEVICE — SUT CHROMIC 3-0 SH 27IN GUT